# Patient Record
Sex: FEMALE | Race: BLACK OR AFRICAN AMERICAN | Employment: OTHER | URBAN - METROPOLITAN AREA
[De-identification: names, ages, dates, MRNs, and addresses within clinical notes are randomized per-mention and may not be internally consistent; named-entity substitution may affect disease eponyms.]

---

## 2022-09-08 ENCOUNTER — HOSPITAL ENCOUNTER (INPATIENT)
Age: 67
LOS: 4 days | Discharge: HOME HEALTH | DRG: 291 | End: 2022-09-12
Attending: STUDENT IN AN ORGANIZED HEALTH CARE EDUCATION/TRAINING PROGRAM | Admitting: FAMILY MEDICINE
Payer: MEDICARE

## 2022-09-08 ENCOUNTER — APPOINTMENT (OUTPATIENT)
Dept: GENERAL RADIOLOGY | Age: 67
DRG: 291 | End: 2022-09-08
Attending: PHYSICIAN ASSISTANT
Payer: MEDICARE

## 2022-09-08 ENCOUNTER — APPOINTMENT (OUTPATIENT)
Dept: VASCULAR SURGERY | Age: 67
DRG: 291 | End: 2022-09-08
Attending: PHYSICIAN ASSISTANT
Payer: MEDICARE

## 2022-09-08 ENCOUNTER — APPOINTMENT (OUTPATIENT)
Dept: CT IMAGING | Age: 67
DRG: 291 | End: 2022-09-08
Attending: PHYSICIAN ASSISTANT
Payer: MEDICARE

## 2022-09-08 DIAGNOSIS — I50.9 ACUTE ON CHRONIC CONGESTIVE HEART FAILURE, UNSPECIFIED HEART FAILURE TYPE (HCC): ICD-10-CM

## 2022-09-08 DIAGNOSIS — R60.0 EDEMA OF BOTH LEGS: ICD-10-CM

## 2022-09-08 DIAGNOSIS — D72.829 LEUKOCYTOSIS, UNSPECIFIED TYPE: ICD-10-CM

## 2022-09-08 DIAGNOSIS — R06.02 SOBOE (SHORTNESS OF BREATH ON EXERTION): ICD-10-CM

## 2022-09-08 DIAGNOSIS — R77.8 ELEVATED TROPONIN: Primary | ICD-10-CM

## 2022-09-08 DIAGNOSIS — R17 ELEVATED BILIRUBIN: ICD-10-CM

## 2022-09-08 PROBLEM — Z78.9 ALCOHOL USE: Status: ACTIVE | Noted: 2022-09-08

## 2022-09-08 PROBLEM — F17.200 SMOKER: Status: ACTIVE | Noted: 2022-09-08

## 2022-09-08 PROBLEM — I25.10 CAD (CORONARY ARTERY DISEASE): Status: ACTIVE | Noted: 2022-09-08

## 2022-09-08 PROBLEM — R41.3 MEMORY IMPAIRMENT: Status: ACTIVE | Noted: 2022-09-08

## 2022-09-08 PROBLEM — J44.9 COPD (CHRONIC OBSTRUCTIVE PULMONARY DISEASE) (HCC): Status: ACTIVE | Noted: 2022-09-08

## 2022-09-08 LAB
ALBUMIN SERPL-MCNC: 1.8 G/DL (ref 3.4–5)
ALBUMIN/GLOB SERPL: 0.3 {RATIO} (ref 0.8–1.7)
ALP SERPL-CCNC: 265 U/L (ref 45–117)
ALT SERPL-CCNC: 14 U/L (ref 13–56)
ANION GAP SERPL CALC-SCNC: 7 MMOL/L (ref 3–18)
APPEARANCE UR: CLEAR
AST SERPL-CCNC: 37 U/L (ref 10–38)
ATRIAL RATE: 111 BPM
ATRIAL RATE: 99 BPM
BACTERIA URNS QL MICRO: ABNORMAL /HPF
BASOPHILS # BLD: 0 K/UL (ref 0–0.1)
BASOPHILS NFR BLD: 0 % (ref 0–2)
BILIRUB SERPL-MCNC: 3.6 MG/DL (ref 0.2–1)
BILIRUB UR QL: ABNORMAL
BNP SERPL-MCNC: 1658 PG/ML (ref 0–900)
BUN SERPL-MCNC: 6 MG/DL (ref 7–18)
BUN/CREAT SERPL: 8 (ref 12–20)
CALCIUM SERPL-MCNC: 8.3 MG/DL (ref 8.5–10.1)
CALCULATED P AXIS, ECG09: 49 DEGREES
CALCULATED P AXIS, ECG09: 64 DEGREES
CALCULATED R AXIS, ECG10: 35 DEGREES
CALCULATED R AXIS, ECG10: 43 DEGREES
CALCULATED T AXIS, ECG11: 47 DEGREES
CALCULATED T AXIS, ECG11: 49 DEGREES
CHLORIDE SERPL-SCNC: 107 MMOL/L (ref 100–111)
CO2 SERPL-SCNC: 25 MMOL/L (ref 21–32)
COLOR UR: ABNORMAL
CREAT SERPL-MCNC: 0.79 MG/DL (ref 0.6–1.3)
D DIMER PPP FEU-MCNC: 6.82 UG/ML(FEU)
DIAGNOSIS, 93000: NORMAL
DIAGNOSIS, 93000: NORMAL
DIFFERENTIAL METHOD BLD: ABNORMAL
EOSINOPHIL # BLD: 0.9 K/UL (ref 0–0.4)
EOSINOPHIL NFR BLD: 4 % (ref 0–5)
EPITH CASTS URNS QL MICRO: ABNORMAL /LPF (ref 0–5)
ERYTHROCYTE [DISTWIDTH] IN BLOOD BY AUTOMATED COUNT: 17.8 % (ref 11.6–14.5)
GLOBULIN SER CALC-MCNC: 6.7 G/DL (ref 2–4)
GLUCOSE SERPL-MCNC: 97 MG/DL (ref 74–99)
GLUCOSE UR STRIP.AUTO-MCNC: NEGATIVE MG/DL
HCT VFR BLD AUTO: 30.4 % (ref 35–45)
HGB BLD-MCNC: 10.6 G/DL (ref 12–16)
HGB UR QL STRIP: NEGATIVE
IMM GRANULOCYTES # BLD AUTO: 0 K/UL
IMM GRANULOCYTES NFR BLD AUTO: 0 %
KETONES UR QL STRIP.AUTO: NEGATIVE MG/DL
LACTATE SERPL-SCNC: 1 MMOL/L (ref 0.4–2)
LEUKOCYTE ESTERASE UR QL STRIP.AUTO: ABNORMAL
LYMPHOCYTES # BLD: 1.7 K/UL (ref 0.9–3.6)
LYMPHOCYTES NFR BLD: 8 % (ref 21–52)
MCH RBC QN AUTO: 33.3 PG (ref 24–34)
MCHC RBC AUTO-ENTMCNC: 34.9 G/DL (ref 31–37)
MCV RBC AUTO: 95.6 FL (ref 78–100)
MONOCYTES # BLD: 1.1 K/UL (ref 0.05–1.2)
MONOCYTES NFR BLD: 5 % (ref 3–10)
NEUTS BAND NFR BLD MANUAL: 2 % (ref 0–5)
NEUTS SEG # BLD: 17.6 K/UL (ref 1.8–8)
NEUTS SEG NFR BLD: 81 % (ref 40–73)
NITRITE UR QL STRIP.AUTO: NEGATIVE
NRBC # BLD: 0 K/UL (ref 0–0.01)
NRBC BLD-RTO: 0 PER 100 WBC
P-R INTERVAL, ECG05: 108 MS
P-R INTERVAL, ECG05: 122 MS
PH UR STRIP: 6.5 [PH] (ref 5–8)
PLATELET # BLD AUTO: 198 K/UL (ref 135–420)
PLATELET COMMENTS,PCOM: ABNORMAL
PMV BLD AUTO: 11.7 FL (ref 9.2–11.8)
POTASSIUM SERPL-SCNC: 3.7 MMOL/L (ref 3.5–5.5)
PROT SERPL-MCNC: 8.5 G/DL (ref 6.4–8.2)
PROT UR STRIP-MCNC: NEGATIVE MG/DL
Q-T INTERVAL, ECG07: 318 MS
Q-T INTERVAL, ECG07: 344 MS
QRS DURATION, ECG06: 68 MS
QRS DURATION, ECG06: 70 MS
QTC CALCULATION (BEZET), ECG08: 432 MS
QTC CALCULATION (BEZET), ECG08: 441 MS
RBC # BLD AUTO: 3.18 M/UL (ref 4.2–5.3)
RBC #/AREA URNS HPF: NEGATIVE /HPF (ref 0–5)
RBC MORPH BLD: ABNORMAL
RBC MORPH BLD: ABNORMAL
SODIUM SERPL-SCNC: 139 MMOL/L (ref 136–145)
SP GR UR REFRACTOMETRY: 1.01 (ref 1–1.03)
TROPONIN-HIGH SENSITIVITY: 64 NG/L (ref 0–54)
TROPONIN-HIGH SENSITIVITY: 72 NG/L (ref 0–54)
UROBILINOGEN UR QL STRIP.AUTO: 4 EU/DL (ref 0.2–1)
VENTRICULAR RATE, ECG03: 111 BPM
VENTRICULAR RATE, ECG03: 99 BPM
WBC # BLD AUTO: 21.3 K/UL (ref 4.6–13.2)
WBC MORPH BLD: ABNORMAL
WBC URNS QL MICRO: ABNORMAL /HPF (ref 0–5)

## 2022-09-08 PROCEDURE — 71275 CT ANGIOGRAPHY CHEST: CPT

## 2022-09-08 PROCEDURE — 83605 ASSAY OF LACTIC ACID: CPT

## 2022-09-08 PROCEDURE — 83880 ASSAY OF NATRIURETIC PEPTIDE: CPT

## 2022-09-08 PROCEDURE — 74011000250 HC RX REV CODE- 250: Performed by: FAMILY MEDICINE

## 2022-09-08 PROCEDURE — 93005 ELECTROCARDIOGRAM TRACING: CPT

## 2022-09-08 PROCEDURE — 74011250636 HC RX REV CODE- 250/636: Performed by: PHYSICIAN ASSISTANT

## 2022-09-08 PROCEDURE — 65270000046 HC RM TELEMETRY

## 2022-09-08 PROCEDURE — 96375 TX/PRO/DX INJ NEW DRUG ADDON: CPT

## 2022-09-08 PROCEDURE — 74011000636 HC RX REV CODE- 636: Performed by: STUDENT IN AN ORGANIZED HEALTH CARE EDUCATION/TRAINING PROGRAM

## 2022-09-08 PROCEDURE — 96376 TX/PRO/DX INJ SAME DRUG ADON: CPT

## 2022-09-08 PROCEDURE — 96374 THER/PROPH/DIAG INJ IV PUSH: CPT

## 2022-09-08 PROCEDURE — 85025 COMPLETE CBC W/AUTO DIFF WBC: CPT

## 2022-09-08 PROCEDURE — 99285 EMERGENCY DEPT VISIT HI MDM: CPT

## 2022-09-08 PROCEDURE — 84484 ASSAY OF TROPONIN QUANT: CPT

## 2022-09-08 PROCEDURE — 80053 COMPREHEN METABOLIC PANEL: CPT

## 2022-09-08 PROCEDURE — 93970 EXTREMITY STUDY: CPT

## 2022-09-08 PROCEDURE — 85379 FIBRIN DEGRADATION QUANT: CPT

## 2022-09-08 PROCEDURE — 71045 X-RAY EXAM CHEST 1 VIEW: CPT

## 2022-09-08 PROCEDURE — 81001 URINALYSIS AUTO W/SCOPE: CPT

## 2022-09-08 PROCEDURE — 74011250637 HC RX REV CODE- 250/637: Performed by: FAMILY MEDICINE

## 2022-09-08 PROCEDURE — 74176 CT ABD & PELVIS W/O CONTRAST: CPT

## 2022-09-08 RX ORDER — LORAZEPAM 2 MG/ML
1 INJECTION, SOLUTION INTRAMUSCULAR; INTRAVENOUS
Status: DISCONTINUED | OUTPATIENT
Start: 2022-09-08 | End: 2022-09-13 | Stop reason: HOSPADM

## 2022-09-08 RX ORDER — ONDANSETRON 4 MG/1
4 TABLET, ORALLY DISINTEGRATING ORAL
Status: DISCONTINUED | OUTPATIENT
Start: 2022-09-08 | End: 2022-09-13 | Stop reason: HOSPADM

## 2022-09-08 RX ORDER — SODIUM CHLORIDE 0.9 % (FLUSH) 0.9 %
5-40 SYRINGE (ML) INJECTION AS NEEDED
Status: DISCONTINUED | OUTPATIENT
Start: 2022-09-08 | End: 2022-09-13 | Stop reason: HOSPADM

## 2022-09-08 RX ORDER — PANTOPRAZOLE SODIUM 40 MG/1
40 TABLET, DELAYED RELEASE ORAL 2 TIMES DAILY
COMMUNITY

## 2022-09-08 RX ORDER — ENOXAPARIN SODIUM 100 MG/ML
40 INJECTION SUBCUTANEOUS DAILY
Status: DISCONTINUED | OUTPATIENT
Start: 2022-09-09 | End: 2022-09-13 | Stop reason: HOSPADM

## 2022-09-08 RX ORDER — FUROSEMIDE 10 MG/ML
40 INJECTION INTRAMUSCULAR; INTRAVENOUS
Status: COMPLETED | OUTPATIENT
Start: 2022-09-08 | End: 2022-09-08

## 2022-09-08 RX ORDER — LORAZEPAM 2 MG/ML
2 INJECTION, SOLUTION INTRAMUSCULAR; INTRAVENOUS
Status: DISCONTINUED | OUTPATIENT
Start: 2022-09-08 | End: 2022-09-13 | Stop reason: HOSPADM

## 2022-09-08 RX ORDER — MORPHINE SULFATE 2 MG/ML
2 INJECTION, SOLUTION INTRAMUSCULAR; INTRAVENOUS
Status: COMPLETED | OUTPATIENT
Start: 2022-09-08 | End: 2022-09-08

## 2022-09-08 RX ORDER — POLYETHYLENE GLYCOL 3350 17 G/17G
17 POWDER, FOR SOLUTION ORAL DAILY PRN
Status: DISCONTINUED | OUTPATIENT
Start: 2022-09-08 | End: 2022-09-13 | Stop reason: HOSPADM

## 2022-09-08 RX ORDER — LORAZEPAM 2 MG/ML
3 INJECTION, SOLUTION INTRAMUSCULAR; INTRAVENOUS
Status: DISCONTINUED | OUTPATIENT
Start: 2022-09-08 | End: 2022-09-13 | Stop reason: HOSPADM

## 2022-09-08 RX ORDER — SODIUM CHLORIDE 0.9 % (FLUSH) 0.9 %
5-40 SYRINGE (ML) INJECTION EVERY 8 HOURS
Status: DISCONTINUED | OUTPATIENT
Start: 2022-09-08 | End: 2022-09-09 | Stop reason: SDUPTHER

## 2022-09-08 RX ORDER — ACETAMINOPHEN 325 MG/1
650 TABLET ORAL
Status: DISCONTINUED | OUTPATIENT
Start: 2022-09-08 | End: 2022-09-13 | Stop reason: HOSPADM

## 2022-09-08 RX ORDER — ONDANSETRON 2 MG/ML
4 INJECTION INTRAMUSCULAR; INTRAVENOUS
Status: COMPLETED | OUTPATIENT
Start: 2022-09-08 | End: 2022-09-08

## 2022-09-08 RX ORDER — SODIUM CHLORIDE 0.9 % (FLUSH) 0.9 %
5-40 SYRINGE (ML) INJECTION EVERY 8 HOURS
Status: DISCONTINUED | OUTPATIENT
Start: 2022-09-08 | End: 2022-09-13 | Stop reason: HOSPADM

## 2022-09-08 RX ORDER — ALBUTEROL SULFATE 90 UG/1
AEROSOL, METERED RESPIRATORY (INHALATION)
COMMUNITY

## 2022-09-08 RX ORDER — ACETAMINOPHEN 650 MG/1
650 SUPPOSITORY RECTAL
Status: DISCONTINUED | OUTPATIENT
Start: 2022-09-08 | End: 2022-09-13 | Stop reason: HOSPADM

## 2022-09-08 RX ORDER — SODIUM CHLORIDE 0.9 % (FLUSH) 0.9 %
5-40 SYRINGE (ML) INJECTION AS NEEDED
Status: DISCONTINUED | OUTPATIENT
Start: 2022-09-08 | End: 2022-09-09 | Stop reason: SDUPTHER

## 2022-09-08 RX ORDER — LORAZEPAM 1 MG/1
1 TABLET ORAL
Status: DISCONTINUED | OUTPATIENT
Start: 2022-09-08 | End: 2022-09-13 | Stop reason: HOSPADM

## 2022-09-08 RX ORDER — GUAIFENESIN 100 MG/5ML
324 LIQUID (ML) ORAL
Status: ACTIVE | OUTPATIENT
Start: 2022-09-08 | End: 2022-09-09

## 2022-09-08 RX ORDER — LORAZEPAM 1 MG/1
2 TABLET ORAL
Status: DISCONTINUED | OUTPATIENT
Start: 2022-09-08 | End: 2022-09-13 | Stop reason: HOSPADM

## 2022-09-08 RX ORDER — ONDANSETRON 2 MG/ML
4 INJECTION INTRAMUSCULAR; INTRAVENOUS
Status: DISCONTINUED | OUTPATIENT
Start: 2022-09-08 | End: 2022-09-13 | Stop reason: HOSPADM

## 2022-09-08 RX ADMIN — FUROSEMIDE 40 MG: 10 INJECTION, SOLUTION INTRAMUSCULAR; INTRAVENOUS at 20:04

## 2022-09-08 RX ADMIN — MORPHINE SULFATE 2 MG: 2 INJECTION, SOLUTION INTRAMUSCULAR; INTRAVENOUS at 19:05

## 2022-09-08 RX ADMIN — ACETAMINOPHEN 650 MG: 325 TABLET ORAL at 22:16

## 2022-09-08 RX ADMIN — SODIUM CHLORIDE, PRESERVATIVE FREE 10 ML: 5 INJECTION INTRAVENOUS at 22:17

## 2022-09-08 RX ADMIN — IOPAMIDOL 100 ML: 755 INJECTION, SOLUTION INTRAVENOUS at 18:18

## 2022-09-08 RX ADMIN — ONDANSETRON 4 MG: 2 INJECTION INTRAMUSCULAR; INTRAVENOUS at 16:54

## 2022-09-08 RX ADMIN — MORPHINE SULFATE 2 MG: 2 INJECTION, SOLUTION INTRAMUSCULAR; INTRAVENOUS at 16:51

## 2022-09-08 NOTE — ED PROVIDER NOTES
EMERGENCY DEPARTMENT HISTORY AND PHYSICAL EXAM    Date: 9/8/2022  Patient Name: Anna Roman    History of Presenting Illness     Chief Complaint   Patient presents with    Leg Pain    Chest Pain         History Provided By: Patient    4:13 PM  Anna Roman is a 74 y.o. female with PMHX of HTN, COPD, possible CHF who presents to the emergency department C/O bilateral leg pain and swelling which has been worsening x2 weeks. She also had mild left to mid chest pain yesterday evening that has since resolved. She does have a history of COPD and states she gets short of breath with exertion, this is not new or changed. Pt denies fever, abdominal pain, nausea, vomiting, dysuria, injury or trauma, and any other sxs or complaints. PCP: Alen Huerta PA-C        Past History     Past Medical History:  Past Medical History:   Diagnosis Date    CAD (coronary artery disease) 9/8/2022    Chronic obstructive pulmonary disease ([facility] [address]     COPD (chronic obstructive pulmonary disease) ([facility] [address] 9/8/2022    Heart failure ([facility] [address]     Hypertension     Memory impairment 9/8/2022       Past Surgical History:  Past Surgical History:   Procedure Laterality Date    HX GYN         Family History:  History reviewed. No pertinent family history. Social History:  Social History     Tobacco Use    Smoking status: Every Day     Packs/day: 0.50     Types: Cigarettes    Smokeless tobacco: Never   Substance Use Topics    Alcohol use: Yes     Alcohol/week: 7.0 standard drinks     Types: 7 Cans of beer per week       Allergies:  No Known Allergies      Review of Systems   Review of Systems   Constitutional: Negative. Negative for fever. Respiratory:  Positive for shortness of breath. Negative for cough. Cardiovascular:  Positive for chest pain and leg swelling. Gastrointestinal:  Negative for abdominal pain. Musculoskeletal:  Positive for arthralgias, joint swelling and myalgias. Skin: Negative.     Neurological:  Negative for weakness and numbness. All other systems reviewed and are negative. Physical Exam     Vitals:    09/08/22 1851 09/08/22 1854 09/08/22 1908 09/08/22 2006   BP:   119/64 122/62   Pulse: (!) 103 (!) 101 100 96   Resp:  15 16 16   Temp:       SpO2:  98% 99% 98%   Weight:       Height:         Physical Exam  Vital signs and nursing notes reviewed. CONSTITUTIONAL: Alert. Well-appearing; thin female, in no apparent distress. HEAD: Normocephalic; atraumatic. EYES:   Conjunctiva clear. NECK: Supple; FROM without difficulty, non-tender; no cervical lymphadenopathy. No JVD. CV: Normal S1, S2; no murmurs, rubs, or gallops. No chest wall tenderness. RESPIRATORY: Normal chest excursion with respiration; breath sounds clear and equal bilaterally; no wheezes, rhonchi, or rales. GI: Normal bowel sounds; non-distended; non-tender. BACK:  No evidence of trauma or deformity. Non-tender to palpation. FROM without difficulty. EXT: BLE: 1-2+ pitting edema bilateral lower legs with generalized tenderness, no rash. Distal sensation tact. 2+ DP pulses. SKIN: Normal for age and race; warm; dry; good turgor; no apparent lesions or exudate. NEURO: A & O x3. Cranial nerves 2-12 intact. Motor 5/5 bilaterally. Sensation intact. PSYCH:  Mood and affect appropriate.            Diagnostic Study Results     Labs -     Recent Results (from the past 12 hour(s))   EKG, 12 LEAD, INITIAL    Collection Time: 09/08/22  4:28 PM   Result Value Ref Range    Ventricular Rate 111 BPM    Atrial Rate 111 BPM    P-R Interval 122 ms    QRS Duration 68 ms    Q-T Interval 318 ms    QTC Calculation (Bezet) 432 ms    Calculated P Axis 49 degrees    Calculated R Axis 43 degrees    Calculated T Axis 49 degrees    Diagnosis       Sinus tachycardia  Otherwise normal ECG  No previous ECGs available     CBC WITH AUTOMATED DIFF    Collection Time: 09/08/22  4:30 PM   Result Value Ref Range    WBC 21.3 (H) 4.6 - 13.2 K/uL    RBC 3.18 (L) 4.20 - 5.30 M/uL    HGB 10.6 (L) 12.0 - 16.0 g/dL    HCT 30.4 (L) 35.0 - 45.0 %    MCV 95.6 78.0 - 100.0 FL    MCH 33.3 24.0 - 34.0 PG    MCHC 34.9 31.0 - 37.0 g/dL    RDW 17.8 (H) 11.6 - 14.5 %    PLATELET 180 639 - 703 K/uL    MPV 11.7 9.2 - 11.8 FL    NRBC 0.0 0  WBC    ABSOLUTE NRBC 0.00 0.00 - 0.01 K/uL    NEUTROPHILS 81 (H) 40 - 73 %    BAND NEUTROPHILS 2 0 - 5 %    LYMPHOCYTES 8 (L) 21 - 52 %    MONOCYTES 5 3 - 10 %    EOSINOPHILS 4 0 - 5 %    BASOPHILS 0 0 - 2 %    IMMATURE GRANULOCYTES 0 %    ABS. NEUTROPHILS 17.6 (H) 1.8 - 8.0 K/UL    ABS. LYMPHOCYTES 1.7 0.9 - 3.6 K/UL    ABS. MONOCYTES 1.1 0.05 - 1.2 K/UL    ABS. EOSINOPHILS 0.9 (H) 0.0 - 0.4 K/UL    ABS. BASOPHILS 0.0 0.0 - 0.1 K/UL    ABS. IMM. GRANS. 0.0 K/UL    DF MANUAL      PLATELET COMMENTS LARGE PLATELETS      RBC COMMENTS ANISOCYTOSIS  1+        RBC COMMENTS TARGET CELLS  1+        WBC COMMENTS VACUOLATED POLYS     METABOLIC PANEL, COMPREHENSIVE    Collection Time: 09/08/22  4:30 PM   Result Value Ref Range    Sodium 139 136 - 145 mmol/L    Potassium 3.7 3.5 - 5.5 mmol/L    Chloride 107 100 - 111 mmol/L    CO2 25 21 - 32 mmol/L    Anion gap 7 3.0 - 18 mmol/L    Glucose 97 74 - 99 mg/dL    BUN 6 (L) 7.0 - 18 MG/DL    Creatinine 0.79 0.6 - 1.3 MG/DL    BUN/Creatinine ratio 8 (L) 12 - 20      GFR est AA >60 >60 ml/min/1.73m2    GFR est non-AA >60 >60 ml/min/1.73m2    Calcium 8.3 (L) 8.5 - 10.1 MG/DL    Bilirubin, total 3.6 (H) 0.2 - 1.0 MG/DL    ALT (SGPT) 14 13 - 56 U/L    AST (SGOT) 37 10 - 38 U/L    Alk.  phosphatase 265 (H) 45 - 117 U/L    Protein, total 8.5 (H) 6.4 - 8.2 g/dL    Albumin 1.8 (L) 3.4 - 5.0 g/dL    Globulin 6.7 (H) 2.0 - 4.0 g/dL    A-G Ratio 0.3 (L) 0.8 - 1.7     NT-PRO BNP    Collection Time: 09/08/22  4:30 PM   Result Value Ref Range    NT pro-BNP 1,658 (H) 0 - 900 PG/ML   TROPONIN-HIGH SENSITIVITY    Collection Time: 09/08/22  4:30 PM   Result Value Ref Range    Troponin-High Sensitivity 72 (H) 0 - 54 ng/L   D DIMER Collection Time: 09/08/22  4:30 PM   Result Value Ref Range    D DIMER 6.82 (H) <0.46 ug/ml(FEU)   TROPONIN-HIGH SENSITIVITY    Collection Time: 09/08/22  7:07 PM   Result Value Ref Range    Troponin-High Sensitivity 64 (H) 0 - 54 ng/L   EKG, 12 LEAD, SUBSEQUENT    Collection Time: 09/08/22  7:15 PM   Result Value Ref Range    Ventricular Rate 99 BPM    Atrial Rate 99 BPM    P-R Interval 108 ms    QRS Duration 70 ms    Q-T Interval 344 ms    QTC Calculation (Bezet) 441 ms    Calculated P Axis 64 degrees    Calculated R Axis 35 degrees    Calculated T Axis 47 degrees    Diagnosis       Sinus rhythm with short [place]  Otherwise normal ECG  When compared with ECG of 08-SEP-2022 16:28,  No significant change was found         Radiologic Studies -   CTA CHEST W OR W WO CONT   Final Result      1. No evidence of pulmonary embolism. 2.  Cardiomegaly with Multifocal bilateral lower lobe and right middle lobe   bandlike and linear atelectasis. Asymmetric enlarged right hilar lymph node,   potentially reactive. 3. Nonspecific small volume of abdominal ascites. DUPLEX LOWER EXT VENOUS BILAT   Final Result      XR CHEST PORT   Final Result      1. Bibasilar atelectasis and/or scarring, with nonspecific interstitial   prominence. Differential considerations may include early interstitial edema   versus chronic versus reactive small airways process. CT ABD PELV WO CONT    (Results Pending)     CT Results  (Last 48 hours)                 09/08/22 1836  CTA CHEST W OR W WO CONT Final result    Impression:      1. No evidence of pulmonary embolism. 2.  Cardiomegaly with Multifocal bilateral lower lobe and right middle lobe   bandlike and linear atelectasis. Asymmetric enlarged right hilar lymph node,   potentially reactive. 3. Nonspecific small volume of abdominal ascites.        Narrative:  EXAM: CTA CHEST W OR W WO CONT       CLINICAL INDICATION/HISTORY: BLE swelling, intermittent CP and SOB, elevated d dimer   -Additional: None       COMPARISON: None       TECHNIQUE: Axial CT imaging from the thoracic inlet through the diaphragm with   intravenous contrast. Coronal and sagittal MIP reformats were generated. One or more dose reduction techniques were used on this CT: automated exposure   control, adjustment of the mAs and/or kVp according to patient size, and   iterative reconstruction techniques. The specific techniques used on this CT   exam have been documented in the patient's electronic medical record. Digital   Imaging and Communications in Medicine (DICOM) format image data are available   to nonaffiliated external healthcare facilities or entities on a secure, media   free, reciprocally searchable basis with patient authorization for at least a   12-month period after this study. _______________       FINDINGS:       EXAM QUALITY: Adequate bolus timing with mild respiratory motion artifact   degradation. PULMONARY ARTERIES: No central, interlobar or visualized segmental branch   pulmonary arterial embolus. Normal caliber main pulmonary artery. MEDIASTINUM: Cardiomegaly without pericardial effusion. Trivial atherosclerotic   calcifications of the transverse aortic arch. LUNGS: Multifocal bilateral lower lobe and right middle lobe bandlike and linear   atelectasis. No consolidation. Cherl Spinner PLEURA: No pneumothorax or effusion. AIRWAY: Normal.       LYMPH NODES: Nonspecific Right hilar 1.6 cm enlarged lymph node. UPPER ABDOMEN: Small volume upper abdominal ascites and mesenteric fluid. Prior   cholecystectomy. Diffusely heterogeneous enhancing appearance of the liver which   is likely secondary to bolus timing artifact. .       OTHER: No acute or aggressive osseous abnormalities identified. _______________                 CXR Results  (Last 48 hours)                 09/08/22 1655  XR CHEST PORT Final result    Impression:      1.  Bibasilar atelectasis and/or scarring, with nonspecific interstitial   prominence. Differential considerations may include early interstitial edema   versus chronic versus reactive small airways process. Narrative:  EXAM: XR CHEST PORT       CLINICAL INDICATION/HISTORY: CP, SOB, BLE edema   -Additional: None       COMPARISON: None       TECHNIQUE: Frontal view of the chest       _______________       FINDINGS:       HEART AND MEDIASTINUM: Midline cardiac silhouette, normal in size. Unremarkable   hilar vascular structures. LUNGS AND PLEURAL SPACES: Bibasilar discoid interstitial opacities with mild   diffuse interstitial prominence. No pneumothorax or effusion. BONY THORAX AND SOFT TISSUES: No acute or destructive osseous abnormality.        _______________                   Medications given in the ED-  Medications   sodium chloride (NS) flush 5-40 mL (has no administration in time range)   sodium chloride (NS) flush 5-40 mL (has no administration in time range)   acetaminophen (TYLENOL) tablet 650 mg (has no administration in time range)     Or   acetaminophen (TYLENOL) suppository 650 mg (has no administration in time range)   polyethylene glycol (MIRALAX) packet 17 g (has no administration in time range)   ondansetron (ZOFRAN ODT) tablet 4 mg (has no administration in time range)     Or   ondansetron (ZOFRAN) injection 4 mg (has no administration in time range)   enoxaparin (LOVENOX) injection 40 mg (has no administration in time range)   aspirin chewable tablet 324 mg (has no administration in time range)   morphine injection 2 mg (2 mg IntraVENous Given 9/8/22 1651)   ondansetron (ZOFRAN) injection 4 mg (4 mg IntraVENous Given 9/8/22 1654)   iopamidoL (ISOVUE-370) 76 % injection 100 mL (100 mL IntraVENous Given 9/8/22 1818)   morphine injection 2 mg (2 mg IntraVENous Given 9/8/22 1905)   furosemide (LASIX) injection 40 mg (40 mg IntraVENous Given 9/8/22 2004)         Medical Decision Making   I am the first provider for this patient. I reviewed the vital signs, available nursing notes, past medical history, past surgical history, family history and social history. Vital Signs-Reviewed the patient's vital signs. Records Reviewed: Nursing Notes    11/26/2018 Stress Test   · The EKG stress is negative for ischemia. · LV perfusion is normal with no evidence of inducible ischemia. · The EKG and imaging portions of the stress study are concordant with no   evidence of inducible myocardial ischemia. Procedures:  Procedures    ED Course:  4:13 PM   Initial assessment performed. The patients presenting problems have been discussed, and they are in agreement with the care plan formulated and outlined with them. I have encouraged them to ask questions as they arise throughout their visit. Provider Notes (Medical Decision Making): Rafael Cerda is a 74 y.o. female who presented with 2 weeks of worsening bilateral lower extremity pain and swelling, now with difficulty ambulating due to her leg pain. She also had some chest pain last night and has had frequent bouts of dyspnea on exertion which she attributed to her COPD. Patient does still smoke. On exam she appears somewhat chronically ill-appearing with 2+ edema and tenderness to bilateral lower extremities. Her vitals are stable, labs revealed leukocytosis of 21,000 though review of records shows that she has had unexplained leukocytosis for several months that has not yet apparently been worked up yet nor has she seen hematology. She denies any fever or symptoms of illness. The rest of her labs show mild anemia, D-dimer was elevated so CTA was performed which was negative for PE but she did have cardiomegaly hilar adenopathy, atelectasis and small volume of abdominal ascites. Bilateral lower extremity duplex is negative for DVT. Her troponin was elevated at 72, EKG without ischemic changes and denies any chest pain or shortness of breath today.   Her proBNP is also elevated so we will diurese with Lasix. she had a stress test in 2018 which was negative for ischemia, but due to evidence of CHF and elevated troponin, unexplained leukocytosis at this time, will admit for further work-up. We will also add CT abdomen pelvis for elevated bilirubin and further evaluation of abdominal ascites seen on CTA of the chest but she has not had any abdominal pain vomiting or diarrhea. Diagnosis and Disposition       8:09 PM consult note  Case discussed with hospitalist Dr. Maggie Hancock who will admit to telemetry floor. Aware of pending CT abd/pelv. ADMISSION NOTE:  8:13 PM  Patient is being admitted to the hospital by Dr. Maggie Hancock. The results of their tests and reasons for their admission have been discussed with them and/or available family. They convey agreement and understanding for the need to be admitted and for their admission diagnosis. CONDITIONS ON ADMISSION:  Deep Vein Thrombosis is not present at the time of admission. Thrombosis is not present at the time of admission. Urinary Tract Infection is not present at the time of admission. Pneumonia is not present at the time of admission. MRSA is not present at the time of admission. Wound infection is not present at the time of admission. Pressure Ulcer is not present at the time of admission. CLINICAL IMPRESSION:    1. Elevated troponin    2. Acute on chronic congestive heart failure, unspecified heart failure type (HCC)    3. Edema of both legs    4. Leukocytosis, unspecified type    5. Elevated bilirubin        PLAN:  1. Admit      _______________________________      Please note that this dictation was completed with [employer], the computer voice recognition software. Quite often unanticipated grammatical, syntax, homophones, and other interpretive errors are inadvertently transcribed by the computer software. Please disregard these errors.   Please excuse any errors that have escaped final proofreading.

## 2022-09-08 NOTE — Clinical Note
Status[de-identified] INPATIENT [101]   Type of Bed: Telemetry [19]   Cardiac Monitoring Required?: Yes   Inpatient Hospitalization Certified Necessary for the Following Reasons: 3.  Patient receiving treatment that can only be provided in an inpatient setting (further clarification in H&P documentation)   Admitting Diagnosis: Elevated troponin [[id number]]   Admitting Physician: Rosalinda Taylor [[id number]]   Attending Physician: Rosalinda Taylor [[id number]]   Estimated Length of Stay: 2 Midnights   Discharge Plan[de-identified] Home with Office Follow-up

## 2022-09-09 ENCOUNTER — APPOINTMENT (OUTPATIENT)
Dept: ULTRASOUND IMAGING | Age: 67
DRG: 291 | End: 2022-09-09
Attending: FAMILY MEDICINE
Payer: MEDICARE

## 2022-09-09 ENCOUNTER — APPOINTMENT (OUTPATIENT)
Dept: NON INVASIVE DIAGNOSTICS | Age: 67
DRG: 291 | End: 2022-09-09
Attending: FAMILY MEDICINE
Payer: MEDICARE

## 2022-09-09 PROBLEM — R77.8 ELEVATED TROPONIN: Status: ACTIVE | Noted: 2022-09-09

## 2022-09-09 PROBLEM — E46 HYPOALBUMINEMIA DUE TO PROTEIN-CALORIE MALNUTRITION (HCC): Status: ACTIVE | Noted: 2022-09-09

## 2022-09-09 PROBLEM — Z78.9 POOR HISTORIAN: Status: ACTIVE | Noted: 2022-09-09

## 2022-09-09 PROBLEM — D64.9 ANEMIA: Status: ACTIVE | Noted: 2022-09-09

## 2022-09-09 PROBLEM — K76.0 HEPATIC STEATOSIS: Status: ACTIVE | Noted: 2022-09-09

## 2022-09-09 PROBLEM — E88.09 HYPOALBUMINEMIA DUE TO PROTEIN-CALORIE MALNUTRITION (HCC): Status: ACTIVE | Noted: 2022-09-09

## 2022-09-09 PROBLEM — Z91.14 NONCOMPLIANCE WITH MEDICATIONS: Status: ACTIVE | Noted: 2022-09-09

## 2022-09-09 LAB
ALBUMIN SERPL-MCNC: 1.4 G/DL (ref 3.4–5)
ALBUMIN/GLOB SERPL: 0.3 {RATIO} (ref 0.8–1.7)
ALP SERPL-CCNC: 210 U/L (ref 45–117)
ALT SERPL-CCNC: 12 U/L (ref 13–56)
AMMONIA PLAS-SCNC: 47 UMOL/L (ref 11–32)
ANION GAP SERPL CALC-SCNC: 6 MMOL/L (ref 3–18)
AST SERPL-CCNC: 35 U/L (ref 10–38)
BILIRUB SERPL-MCNC: 2.9 MG/DL (ref 0.2–1)
BUN SERPL-MCNC: 6 MG/DL (ref 7–18)
BUN/CREAT SERPL: 7 (ref 12–20)
CALCIUM SERPL-MCNC: 7.8 MG/DL (ref 8.5–10.1)
CHLORIDE SERPL-SCNC: 107 MMOL/L (ref 100–111)
CHOLEST SERPL-MCNC: 88 MG/DL
CO2 SERPL-SCNC: 26 MMOL/L (ref 21–32)
CREAT SERPL-MCNC: 0.86 MG/DL (ref 0.6–1.3)
CRP SERPL HS-MCNC: >9.5 MG/L
ECHO AO ROOT DIAM: 2.9 CM
ECHO AO ROOT INDEX: 1.88 CM/M2
ECHO AV AREA PEAK VELOCITY: 1.8 CM2
ECHO AV AREA VTI: 1.8 CM2
ECHO AV AREA/BSA PEAK VELOCITY: 1.2 CM2/M2
ECHO AV AREA/BSA VTI: 1.2 CM2/M2
ECHO AV MEAN GRADIENT: 6 MMHG
ECHO AV MEAN VELOCITY: 1.2 M/S
ECHO AV PEAK GRADIENT: 11 MMHG
ECHO AV PEAK VELOCITY: 1.7 M/S
ECHO AV VELOCITY RATIO: 0.59
ECHO AV VTI: 37.3 CM
ECHO EST RA PRESSURE: 8 MMHG
ECHO IVC PROX: 20 CM
ECHO LA DIAMETER INDEX: 2.47 CM/M2
ECHO LA DIAMETER: 3.8 CM
ECHO LA TO AORTIC ROOT RATIO: 1.31
ECHO LA VOL 2C: 42 ML (ref 22–52)
ECHO LA VOL 4C: 42 ML (ref 22–52)
ECHO LA VOL BP: 42 ML (ref 22–52)
ECHO LA VOL/BSA BIPLANE: 27 ML/M2 (ref 16–34)
ECHO LA VOLUME AREA LENGTH: 47 ML
ECHO LA VOLUME INDEX A2C: 27 ML/M2 (ref 16–34)
ECHO LA VOLUME INDEX A4C: 27 ML/M2 (ref 16–34)
ECHO LA VOLUME INDEX AREA LENGTH: 31 ML/M2 (ref 16–34)
ECHO LV E' LATERAL VELOCITY: 11 CM/S
ECHO LV E' SEPTAL VELOCITY: 10 CM/S
ECHO LV EDV A2C: 72 ML
ECHO LV EDV A4C: 72 ML
ECHO LV EDV BP: 74 ML (ref 56–104)
ECHO LV EDV INDEX A4C: 47 ML/M2
ECHO LV EDV INDEX BP: 48 ML/M2
ECHO LV EDV NDEX A2C: 47 ML/M2
ECHO LV EJECTION FRACTION A2C: 53 %
ECHO LV EJECTION FRACTION A4C: 58 %
ECHO LV EJECTION FRACTION BIPLANE: 57 % (ref 55–100)
ECHO LV ESV A2C: 34 ML
ECHO LV ESV A4C: 30 ML
ECHO LV ESV BP: 32 ML (ref 19–49)
ECHO LV ESV INDEX A2C: 22 ML/M2
ECHO LV ESV INDEX A4C: 19 ML/M2
ECHO LV ESV INDEX BP: 21 ML/M2
ECHO LV FRACTIONAL SHORTENING: 28 % (ref 28–44)
ECHO LV GLOBAL LONGITUDINAL STRAIN (GLS): -18.5 %
ECHO LV INTERNAL DIMENSION DIASTOLE INDEX: 3.51 CM/M2
ECHO LV INTERNAL DIMENSION DIASTOLIC: 5.4 CM (ref 3.9–5.3)
ECHO LV INTERNAL DIMENSION SYSTOLIC INDEX: 2.53 CM/M2
ECHO LV INTERNAL DIMENSION SYSTOLIC: 3.9 CM
ECHO LV IVSD: 0.6 CM (ref 0.6–0.9)
ECHO LV MASS 2D: 98.1 G (ref 67–162)
ECHO LV MASS INDEX 2D: 63.7 G/M2 (ref 43–95)
ECHO LV POSTERIOR WALL DIASTOLIC: 0.5 CM (ref 0.6–0.9)
ECHO LV RELATIVE WALL THICKNESS RATIO: 0.19
ECHO LVOT AREA: 3.1 CM2
ECHO LVOT AV VTI INDEX: 0.59
ECHO LVOT DIAM: 2 CM
ECHO LVOT MEAN GRADIENT: 2 MMHG
ECHO LVOT PEAK GRADIENT: 4 MMHG
ECHO LVOT PEAK VELOCITY: 1 M/S
ECHO LVOT STROKE VOLUME INDEX: 45.1 ML/M2
ECHO LVOT SV: 69.4 ML
ECHO LVOT VTI: 22.1 CM
ECHO MV A VELOCITY: 0.96 M/S
ECHO MV E DECELERATION TIME (DT): 192 MS
ECHO MV E VELOCITY: 1.07 M/S
ECHO MV E/A RATIO: 1.11
ECHO MV E/E' LATERAL: 9.73
ECHO MV E/E' RATIO (AVERAGED): 10.21
ECHO MV E/E' SEPTAL: 10.7
ECHO PULMONARY ARTERY SYSTOLIC PRESSURE (PASP): 44 MMHG
ECHO RIGHT VENTRICULAR SYSTOLIC PRESSURE (RVSP): 44 MMHG
ECHO RV FREE WALL PEAK S': 15 CM/S
ECHO RV INTERNAL DIMENSION: 2.6 CM
ECHO RV TAPSE: 2.5 CM (ref 1.7–?)
ECHO TV REGURGITANT MAX VELOCITY: 3.02 M/S
ECHO TV REGURGITANT PEAK GRADIENT: 36 MMHG
ERYTHROCYTE [DISTWIDTH] IN BLOOD BY AUTOMATED COUNT: 18 % (ref 11.6–14.5)
ERYTHROCYTE [SEDIMENTATION RATE] IN BLOOD: 42 MM/HR (ref 0–30)
FERRITIN SERPL-MCNC: 152 NG/ML (ref 8–388)
FOLATE SERPL-MCNC: 12.1 NG/ML (ref 3.1–17.5)
GLOBULIN SER CALC-MCNC: 5.6 G/DL (ref 2–4)
GLUCOSE SERPL-MCNC: 94 MG/DL (ref 74–99)
HAPTOGLOB SERPL-MCNC: 41 MG/DL (ref 30–200)
HCT VFR BLD AUTO: 25.7 % (ref 35–45)
HDLC SERPL-MCNC: 21 MG/DL (ref 40–60)
HDLC SERPL: 4.2 {RATIO} (ref 0–5)
HGB BLD-MCNC: 8.9 G/DL (ref 12–16)
IGA SERPL-MCNC: 1240 MG/DL (ref 70–400)
IGG SERPL-MCNC: 2830 MG/DL (ref 700–1600)
IGM SERPL-MCNC: 178 MG/DL (ref 40–230)
IRON SATN MFR SERPL: 114 % (ref 20–50)
IRON SERPL-MCNC: 80 UG/DL (ref 50–175)
LDH SERPL L TO P-CCNC: 81 U/L (ref 81–234)
LDLC SERPL CALC-MCNC: 49.6 MG/DL (ref 0–100)
LIPID PROFILE,FLP: ABNORMAL
MCH RBC QN AUTO: 33.1 PG (ref 24–34)
MCHC RBC AUTO-ENTMCNC: 34.6 G/DL (ref 31–37)
MCV RBC AUTO: 95.5 FL (ref 78–100)
NRBC # BLD: 0 K/UL (ref 0–0.01)
NRBC BLD-RTO: 0 PER 100 WBC
PLATELET # BLD AUTO: 158 K/UL (ref 135–420)
PMV BLD AUTO: 12.1 FL (ref 9.2–11.8)
POTASSIUM SERPL-SCNC: 3.5 MMOL/L (ref 3.5–5.5)
PROT SERPL-MCNC: 7 G/DL (ref 6.4–8.2)
RBC # BLD AUTO: 2.69 M/UL (ref 4.2–5.3)
SODIUM SERPL-SCNC: 139 MMOL/L (ref 136–145)
TIBC SERPL-MCNC: 70 UG/DL (ref 250–450)
TRIGL SERPL-MCNC: 87 MG/DL (ref ?–150)
TROPONIN-HIGH SENSITIVITY: 61 NG/L (ref 0–54)
TROPONIN-HIGH SENSITIVITY: 61 NG/L (ref 0–54)
VIT B12 SERPL-MCNC: >2000 PG/ML (ref 211–911)
VLDLC SERPL CALC-MCNC: 17.4 MG/DL
WBC # BLD AUTO: 15.9 K/UL (ref 4.6–13.2)

## 2022-09-09 PROCEDURE — 74011250637 HC RX REV CODE- 250/637: Performed by: FAMILY MEDICINE

## 2022-09-09 PROCEDURE — 83521 IG LIGHT CHAINS FREE EACH: CPT

## 2022-09-09 PROCEDURE — 85652 RBC SED RATE AUTOMATED: CPT

## 2022-09-09 PROCEDURE — 86141 C-REACTIVE PROTEIN HS: CPT

## 2022-09-09 PROCEDURE — 76705 ECHO EXAM OF ABDOMEN: CPT

## 2022-09-09 PROCEDURE — 84484 ASSAY OF TROPONIN QUANT: CPT

## 2022-09-09 PROCEDURE — 36415 COLL VENOUS BLD VENIPUNCTURE: CPT

## 2022-09-09 PROCEDURE — 94640 AIRWAY INHALATION TREATMENT: CPT

## 2022-09-09 PROCEDURE — 80053 COMPREHEN METABOLIC PANEL: CPT

## 2022-09-09 PROCEDURE — P9047 ALBUMIN (HUMAN), 25%, 50ML: HCPCS | Performed by: FAMILY MEDICINE

## 2022-09-09 PROCEDURE — 82784 ASSAY IGA/IGD/IGG/IGM EACH: CPT

## 2022-09-09 PROCEDURE — 74011000250 HC RX REV CODE- 250: Performed by: FAMILY MEDICINE

## 2022-09-09 PROCEDURE — 80061 LIPID PANEL: CPT

## 2022-09-09 PROCEDURE — 65270000046 HC RM TELEMETRY

## 2022-09-09 PROCEDURE — 93306 TTE W/DOPPLER COMPLETE: CPT

## 2022-09-09 PROCEDURE — 83010 ASSAY OF HAPTOGLOBIN QUANT: CPT

## 2022-09-09 PROCEDURE — 83540 ASSAY OF IRON: CPT

## 2022-09-09 PROCEDURE — 82728 ASSAY OF FERRITIN: CPT

## 2022-09-09 PROCEDURE — 99221 1ST HOSP IP/OBS SF/LOW 40: CPT | Performed by: NURSE PRACTITIONER

## 2022-09-09 PROCEDURE — 85027 COMPLETE CBC AUTOMATED: CPT

## 2022-09-09 PROCEDURE — 74011250636 HC RX REV CODE- 250/636: Performed by: FAMILY MEDICINE

## 2022-09-09 PROCEDURE — 82140 ASSAY OF AMMONIA: CPT

## 2022-09-09 PROCEDURE — 83615 LACTATE (LD) (LDH) ENZYME: CPT

## 2022-09-09 PROCEDURE — 82607 VITAMIN B-12: CPT

## 2022-09-09 RX ORDER — SERTRALINE HYDROCHLORIDE 50 MG/1
25 TABLET, FILM COATED ORAL DAILY
Status: DISCONTINUED | OUTPATIENT
Start: 2022-09-09 | End: 2022-09-13 | Stop reason: HOSPADM

## 2022-09-09 RX ORDER — IPRATROPIUM BROMIDE 0.5 MG/2.5ML
0.5 SOLUTION RESPIRATORY (INHALATION) 2 TIMES DAILY
Status: DISCONTINUED | OUTPATIENT
Start: 2022-09-09 | End: 2022-09-09

## 2022-09-09 RX ORDER — CARVEDILOL 3.12 MG/1
3.12 TABLET ORAL 2 TIMES DAILY WITH MEALS
Status: DISCONTINUED | OUTPATIENT
Start: 2022-09-09 | End: 2022-09-13 | Stop reason: HOSPADM

## 2022-09-09 RX ORDER — IPRATROPIUM BROMIDE 0.5 MG/2.5ML
0.5 SOLUTION RESPIRATORY (INHALATION)
Status: DISCONTINUED | OUTPATIENT
Start: 2022-09-09 | End: 2022-09-13 | Stop reason: HOSPADM

## 2022-09-09 RX ORDER — ALBUMIN HUMAN 250 G/1000ML
25 SOLUTION INTRAVENOUS EVERY 6 HOURS
Status: DISCONTINUED | OUTPATIENT
Start: 2022-09-09 | End: 2022-09-13 | Stop reason: HOSPADM

## 2022-09-09 RX ORDER — GUAIFENESIN 100 MG/5ML
81 LIQUID (ML) ORAL DAILY
Status: DISCONTINUED | OUTPATIENT
Start: 2022-09-09 | End: 2022-09-13 | Stop reason: HOSPADM

## 2022-09-09 RX ORDER — FUROSEMIDE 10 MG/ML
20 INJECTION INTRAMUSCULAR; INTRAVENOUS 2 TIMES DAILY
Status: DISCONTINUED | OUTPATIENT
Start: 2022-09-09 | End: 2022-09-13 | Stop reason: HOSPADM

## 2022-09-09 RX ORDER — PANTOPRAZOLE SODIUM 40 MG/1
40 TABLET, DELAYED RELEASE ORAL 2 TIMES DAILY
Status: DISCONTINUED | OUTPATIENT
Start: 2022-09-09 | End: 2022-09-13 | Stop reason: HOSPADM

## 2022-09-09 RX ADMIN — FUROSEMIDE 20 MG: 10 INJECTION, SOLUTION INTRAMUSCULAR; INTRAVENOUS at 09:32

## 2022-09-09 RX ADMIN — POTASSIUM BICARBONATE 40 MEQ: 782 TABLET, EFFERVESCENT ORAL at 09:32

## 2022-09-09 RX ADMIN — SODIUM CHLORIDE, PRESERVATIVE FREE 10 ML: 5 INJECTION INTRAVENOUS at 06:10

## 2022-09-09 RX ADMIN — ALBUMIN (HUMAN) 25 G: 0.25 INJECTION, SOLUTION INTRAVENOUS at 14:52

## 2022-09-09 RX ADMIN — SODIUM CHLORIDE, PRESERVATIVE FREE 10 ML: 5 INJECTION INTRAVENOUS at 22:32

## 2022-09-09 RX ADMIN — ALBUMIN (HUMAN) 25 G: 0.25 INJECTION, SOLUTION INTRAVENOUS at 02:04

## 2022-09-09 RX ADMIN — SODIUM CHLORIDE, PRESERVATIVE FREE 10 ML: 5 INJECTION INTRAVENOUS at 06:09

## 2022-09-09 RX ADMIN — LORAZEPAM 2 MG: 1 TABLET ORAL at 02:05

## 2022-09-09 RX ADMIN — FUROSEMIDE 20 MG: 10 INJECTION, SOLUTION INTRAMUSCULAR; INTRAVENOUS at 22:32

## 2022-09-09 RX ADMIN — ASPIRIN 81 MG: 81 TABLET, CHEWABLE ORAL at 09:32

## 2022-09-09 RX ADMIN — IPRATROPIUM BROMIDE 0.5 MG: 0.5 SOLUTION RESPIRATORY (INHALATION) at 07:31

## 2022-09-09 RX ADMIN — ACETAMINOPHEN 650 MG: 325 TABLET ORAL at 22:31

## 2022-09-09 RX ADMIN — PANTOPRAZOLE SODIUM 40 MG: 40 TABLET, DELAYED RELEASE ORAL at 22:32

## 2022-09-09 RX ADMIN — ALBUMIN (HUMAN) 25 G: 0.25 INJECTION, SOLUTION INTRAVENOUS at 19:33

## 2022-09-09 RX ADMIN — ENOXAPARIN SODIUM 40 MG: 100 INJECTION SUBCUTANEOUS at 09:32

## 2022-09-09 RX ADMIN — PANTOPRAZOLE SODIUM 40 MG: 40 TABLET, DELAYED RELEASE ORAL at 09:33

## 2022-09-09 RX ADMIN — ALBUMIN (HUMAN) 25 G: 0.25 INJECTION, SOLUTION INTRAVENOUS at 06:09

## 2022-09-09 RX ADMIN — SERTRALINE HYDROCHLORIDE 25 MG: 50 TABLET ORAL at 09:00

## 2022-09-09 RX ADMIN — POTASSIUM BICARBONATE 40 MEQ: 782 TABLET, EFFERVESCENT ORAL at 22:31

## 2022-09-09 NOTE — PROGRESS NOTES
Hospitalist Progress Note    Patient: Cliff Quevedo MRN: [id number]  [facility]: [id number]    YOB: 1955  Age: 74 y.o. Sex: female    DOA: 9/8/2022 LOS:  LOS: 1 day          Chief Complaint:    acute CHF exacerbation    Assessment/Plan     81-year-old female with CAD, COPD, CHF, chronic leukocytosis, chronic alcohol use, memory impairment, and tobacco use is admitted with acute CHF exacerbation with bilateral lower extremity swelling, hypoalbuminemia due to protein-calorie malnutrition, and hepatic steatosis. Acute CHF exacerbation with elevated troponin  --Telemetry  --Trend troponins, are downtrending  --Cardiology consult  --IV lasix 20 mg BID  --Echocardiogram to assess EF  --RUQ ultrasound to evaluate for cirrhosis given longstanding alcohol use     Chronic leukocytosis-has not seen heme/onc for a workup. No active sign of infection. She has hepatosplenomegaly noted on CT abdomen. --Oncology consult    Anemia   -Hgb 8.9 this AM   -check iron studies and stool occult     Elevated d-dimer -  CTA chest neg for PE  PVL studies neg for DVT     Hypoalbuminemia due to protein calorie malnutrition-contributing to worsening leg swelling. She has poor appetite.   --Already on Boost supplementation  --Albumin infusions     Hepatic steatosis-suspect possible cirrhosis as well  --Follow up RUQ ultrasound  --Hepatology consult if cirrhosis is present     Noncompliance with medications-was prescribed lasix for CHF but stopped taking it and has not followed up with cardiology     Chronic longstanding alcohol use-she drinks 3-4 beers nightly  --[facility] protocol to monitor for alcohol withdrawal     COPD without exacerbation  --Atrovent nebs twice daily     Smoker  --Declines nicotine patch     CAD  --Continue daily ASA  --Check lipid panel  --Start statin for discharge     Cognitive impairment-chronic, lives with her daughter  --Check ammonia level     Full code  --Palliative care consult given overall poor prognosis     Prophylaxis: protonix PO, lovenox SQ  Disposition :  Patient Active Problem List   Diagnosis Code    CAD (coronary artery disease) I25.10    Memory impairment R41.3    COPD (chronic obstructive pulmonary disease) ([facility]) J44.9    Acute exacerbation of CHF (congestive heart failure) ([facility]) I50.9    Leukocytosis D72.829    Alcohol use Z72.89    Smoker F17.200    Poor historian Z78.9    Hepatic steatosis K76.0    Noncompliance with medications Z91.14    Elevated troponin R77.8    Hypoalbuminemia due to protein-calorie malnutrition ([facility]) E88.09, E46    Anemia D64.9       Subjective:    No complaints this AM   Very limited speech   Sitting up in bed     Review of systems:    Constitutional: denies fevers, chills, myalgias  Respiratory: denies SOB, cough  Cardiovascular: denies chest pain, palpitations  Gastrointestinal: denies nausea, vomiting, diarrhea      Vital signs/Intake and Output:  Visit Vitals  /60   Pulse 86   Temp 98 °F (36.7 °C)   Resp 16   Ht 5' 1\" (1.549 m)   Wt 56.2 kg (124 lb)   SpO2 100%   BMI 23.43 kg/m²     Current Shift:  No intake/output data recorded. Last three shifts:  No intake/output data recorded.     Exam:    General: Well developed, alert, NAD, OX3  Head/Neck: NCAT, supple, No masses, No lymphadenopathy  CVS:Regular rate and rhythm, no M/R/G, S1/S2 heard, no thrill  Lungs:Clear to auscultation bilaterally, no wheezes, rhonchi, or rales  Abdomen: Soft, Nontender, No distention, Normal Bowel sounds, No hepatomegaly  Extremities: No C/C/E, pulses palpable 2+  Skin:normal texture and turgor, no rashes, no lesions  Neuro:grossly normal , follows commands  Psych:appropriate                Labs: Results:       Chemistry Recent Labs     09/09/22  0140 09/08/22  1630   GLU 94 97    139   K 3.5 3.7    107   CO2 26 25   BUN 6* 6*   CREA 0.86 0.79   CA 7.8* 8.3*   AGAP 6 7   BUCR 7* 8*   * 265*   TP 7.0 8.5*   ALB 1.4* 1.8*   GLOB 5.6* 6.7*   AGRAT 0.3* 0.3*      CBC w/Diff Recent Labs     09/09/22  0140 09/08/22  1630   WBC 15.9* 21.3*   RBC 2.69* 3.18*   HGB 8.9* 10.6*   HCT 25.7* 30.4*    198   GRANS  --  81*   LYMPH  --  8*   EOS  --  4      Cardiac Enzymes No results for input(s): CPK, CKND1, AURELIA in the last 72 hours. No lab exists for component: CKRMB, TROIP   Coagulation No results for input(s): PTP, INR, APTT, INREXT, INREXT in the last 72 hours. Lipid Panel Lab Results   Component Value Date/Time    Cholesterol, total 88 09/09/2022 01:40 AM    HDL Cholesterol 21 (L) 09/09/2022 01:40 AM    LDL, calculated 49.6 09/09/2022 01:40 AM    VLDL, calculated 17.4 09/09/2022 01:40 AM    Triglyceride 87 09/09/2022 01:40 AM    CHOL/HDL Ratio 4.2 09/09/2022 01:40 AM      BNP No results for input(s): BNPP in the last 72 hours.    Liver Enzymes Recent Labs     09/09/22  0140   TP 7.0   ALB 1.4*   *      Thyroid Studies No results found for: T4, T3U, TSH, TSHEXT, TSHEXT     Procedures/imaging: see electronic medical records for all procedures/Xrays and details which were not copied into this note but were reviewed prior to creation of Summer Atkinson MD

## 2022-09-09 NOTE — ACP (ADVANCE CARE PLANNING)
Advance Care Planning     General Advance Care Planning (ACP) Conversation      Date of Conversation: 9/9/2022  Conducted with: Patient with Decision Making Capacity    Healthcare Decision Maker:     Primary Decision Maker: Hans Castellanos - Child - [phone]    Today we documented Decision Maker(s) consistent with Legal Next of Kin hierarchy. Content/Action Overview:   Has NO ACP documents/care preferences. Too fatigued for extended conversations today  Did not have code status discussion today because of drowsiness. Will continue to follow for goals of care discussions    Length of Voluntary ACP Conversation in minutes:  <16 minutes (Non-Billable)    9/9/2022 0930 Seen today in room 337 along with Dora Peralta NP. Lying in bed with head of bed elevated. Nurse in room administering medications. When taking her PO meds, she required multiple drinks of water. No coughing. Had difficulty manipulating the straw. Answered questions with 1-2 word answers but would not engage in conversation. Worked to get comfortbale in bed and states she did not need help but moved very slowly. Came to the ED on 9/8/2022 from home per POV for bilateral leg pain/swelling x 2 weeks    Admitted to telemetry for acute CHF with elevated troponin (trend troponins, cardiology consultation, ECHO, diuresis), chronic leukocytosis (hematology consultation ), hypoalbuminemia ( nutritional support, IV albumin), hepatic steatosis (RUQ ultrasound, possible hepatology consultation based on US results), chronic alcohol use ([facility] protocol), COPD (nebs)    PMH (from record review) significant for COPD, CHF,CAD, mild cognitive impairment, leukocytosis    The palliative care team has been consulted for code status discussion. Introduced the role of palliative medicine for the hospitalized patient. Lives with her daughter, Zulema Barron. Not able to give good description of her level of independence.      She states she has three children but did not give their names. She became more sleepy and did not want to continue conversations with palliative team.FULL CODE order placed on admission. Will work to confirm with patient and daughter during her admission. 1220: telephone call to Manuel Snow, daughter, requesting call back. Disposition plan: to be determined based on response to treatment and patient/family decisions    Palliative care will continue to follow Shira Kent  and her family during her hospitalization and support them as they make healthcare decisions and define goals of care.       Lary Davis RN, MSN  Palliative Medicine  P: [phone]

## 2022-09-09 NOTE — PROGRESS NOTES
Reason for Admission:   Elevated troponin [R77.8]    PCP: Rai NAGEL, TAN    There are currently no Active Isolations  No active infections                  RUR Score:     10%             Resources/supports,as identified by patient/family:     Lives with daughter    Barriers to discharge:             Plan for utilizing home health:    yes                          Likelihood of readmission:   10%         Transition of Care Plan:    home with home health vs. Snf depending upon clinical progression and therapy recommendations    Initial assessment completed with patient. Cognitive status of patient: oriented to time, place, person and situation. Face sheet information confirmed:  yes. The patient designates her daughter Nolvia Harvey [phone] to participate in her discharge plan and to receive any needed information. This patient lives in a apartment with patient and daughter. Patient is able to navigate steps as needed. Prior to hospitalization, patient was considered to be independent with ADLs/IADLS : yes . The patient and daughter will be available to transport patient home upon discharge. The patient already has none reported medical equipment available in the home. Patient is not currently active with home health. Patient has not stayed in a skilled nursing facility or rehab. Was  stay within last 60 days : no. This patient is on dialysis/days :no    Currently, the discharge plan is Home with [address] with family assistance. The patient states that she can obtain her medications from the pharmacy, and take her medications as directed. Patient's current insurance is Payor: [facility] MEDICARE / Plan: [address] [facility], [place] CCP / Product Type: Managed Care Medicare /        Care Management Interventions  PCP Verified by CM:  Yes  Transition of Care Consult (CM Consult): Discharge Planning  Support Systems: Child(reji)  Confirm Follow Up Transport: Family  The Plan for Transition of Care is Related to the Following Treatment Goals : home with [place] versus home with family assistance  Discharge Location  Patient Expects to be Discharged to[de-identified] Home with home health

## 2022-09-09 NOTE — PROGRESS NOTES
Pageameena  Doctor  [facility] AT [place] about patients condition. . Patient seems a little uneasy to arouse. Patient is able to answer orientation questions .

## 2022-09-09 NOTE — CONSULTS
Patient presents to Urgent Care with dadGeorge for complaints of sore throat, cough, and had close contact exposure at school to COVID 19. Symptoms started yesterday.    OTC: Mucinex, Advil    Nurse: N95 mask, face shield, gloves, gown, and goggles.  Patient and dad: Wore mask     Phone: [phone]  Paging : [phone]     Hematology/Oncology Consult Note    Patient: Jenny Modi MRN: [id number]  CSN: [id number]    YOB: 1955  Age: 74 y.o. Sex: female    DOA: 9/8/2022 LOS:  LOS: 1 day            REASON FOR CONSULTATION:     Leukocytosis    ASSESSMENT:     Hem/Onc Problems:(1) Leukocytosis, chronic, no evidence of infection; (2) Hyperglobulinemia    Reason for Admission: SOB CHF exacerbation  Other Active Problems:       Active Problems:  Hospital Problems  Date Reviewed: 9/8/2022            Codes Class Noted POA    Poor historian ICD-10-CM: Z78.9  ICD-9-CM: V49.89  9/9/2022 Yes        Hepatic steatosis ICD-10-CM: K76.0  ICD-9-CM: 571.8  9/9/2022 Yes        Noncompliance with medications ICD-10-CM: Z91.14  ICD-9-CM: V15.81  9/9/2022 Yes        Elevated troponin ICD-10-CM: R77.8  ICD-9-CM: 790.6  9/9/2022 Yes        Hypoalbuminemia due to protein-calorie malnutrition ([facility] ICD-10-CM: E88.09, E46  ICD-9-CM: 273.8, 263.9  9/9/2022 Yes        CAD (coronary artery disease) ICD-10-CM: I25.10  ICD-9-CM: 414.00  9/8/2022 Yes        Memory impairment ICD-10-CM: R41.3  ICD-9-CM: 780.93  9/8/2022 Yes        COPD (chronic obstructive pulmonary disease) ([facility] ICD-10-CM: J44.9  ICD-9-CM: 172  9/8/2022 Yes        * (Principal) Acute exacerbation of CHF (congestive heart failure) ([facility] ICD-10-CM: I50.9  ICD-9-CM: 428.0  9/8/2022 Yes        Leukocytosis ICD-10-CM: N73.654  ICD-9-CM: 288.60  9/8/2022 Yes        Alcohol use ICD-10-CM: Z72.89  ICD-9-CM: V49.89  9/8/2022 Yes        Smoker ICD-10-CM: P00.367  ICD-9-CM: 305.1  9/8/2022 Yes             RECOMMENDATIONS:   For leukocytosis, there is evidence of infection or steroid use. It has been chronic and has not trended up much. I don't think this warrants CML or CLL work up yet. I will obtain differentials and follow up trend to decide on future course of action.    For anemia, I will order iron panels, B12/folate acid, Hgb electrophoresis. For her hypergobulinemia, I will get ESR, CRP, immunoglobulin level, SPEP, free light chains to rule out MM  Other managements per primary team        HPI:     Hong Keys is a 74 y.o., BLACK/, female, who I have been asked to see for leukocytosis. She does not converse much, history by chart review. She has history of CAD, COPD, CHF, chronic leukocytosis, chronic alcohol use, memory impairment, and tobacco use . She presented to ED for leg swelling, abdominal swelling, shortness of breath, chest pain, and fatigue worsening over the past several months. Her legs and abdomen have swelled especially in the last few weeks. She has had some shortness of breath with chest pain for months as well. She was prescribed lasix but stopped taking it and hasn't followed up with cardiology (unsure how long ago). She lives with her daughter. She drinks 3-4 beers per night to help her sleep for many years. She is a very poor historian and has trouble any answering questions with vague responses. She was noted to have luekocytosis, WBC 15.9 0 no differentials available today; it was 20 yesterday. She denies fever. She cannot tell me if she has taken steroid. In review of labs also noted elevated globulin 5.6, and anemia with hgb 8.9 with MCV 95.5.      Past Medical History:   Diagnosis Date    Alcohol use 9/8/2022    CAD (coronary artery disease) 9/8/2022    Chronic obstructive pulmonary disease (HCC)     COPD (chronic obstructive pulmonary disease) ([facility] 9/8/2022    Heart failure ([facility]     Hypertension     Memory impairment 9/8/2022    Smoker 9/8/2022       Past Surgical History:   Procedure Laterality Date    HX GYN         Family History   Problem Relation Age of Onset    Hypertension Mother     Elevated Lipids Mother     Heart Disease Mother     Diabetes Mother     Hypertension Father     Heart Disease Father     Elevated Lipids Father     Kidney Disease Father     Seizures Daughter        Social History     Socioeconomic History    Marital status:    Tobacco Use    Smoking status: Every Day     Packs/day: 0.50     Types: Cigarettes    Smokeless tobacco: Never   Substance and Sexual Activity    Alcohol use:  Yes     Alcohol/week: 7.0 standard drinks     Types: 7 Cans of beer per week       Current Facility-Administered Medications   Medication Dose Route Frequency    pantoprazole (PROTONIX) tablet 40 mg  40 mg Oral BID    sertraline (ZOLOFT) tablet 25 mg  25 mg Oral DAILY    carvediloL (COREG) tablet 3.125 mg  3.125 mg Oral BID WITH MEALS    aspirin chewable tablet 81 mg  81 mg Oral DAILY    furosemide (LASIX) injection 20 mg  20 mg IntraVENous BID    potassium bicarb-citric acid (EFFER-K) tablet 40 mEq  40 mEq Oral BID    albumin human 25% (BUMINATE) solution 25 g  25 g IntraVENous Q6H    ipratropium (ATROVENT) 0.02 % nebulizer solution 0.5 mg  0.5 mg Nebulization BID RT    sodium chloride (NS) flush 5-40 mL  5-40 mL IntraVENous Q8H    sodium chloride (NS) flush 5-40 mL  5-40 mL IntraVENous PRN    acetaminophen (TYLENOL) tablet 650 mg  650 mg Oral Q6H PRN    Or    acetaminophen (TYLENOL) suppository 650 mg  650 mg Rectal Q6H PRN    polyethylene glycol (MIRALAX) packet 17 g  17 g Oral DAILY PRN    ondansetron (ZOFRAN ODT) tablet 4 mg  4 mg Oral Q8H PRN    Or    ondansetron (ZOFRAN) injection 4 mg  4 mg IntraVENous Q6H PRN    enoxaparin (LOVENOX) injection 40 mg  40 mg SubCUTAneous DAILY    sodium chloride (NS) flush 5-40 mL  5-40 mL IntraVENous Q8H    sodium chloride (NS) flush 5-40 mL  5-40 mL IntraVENous PRN    LORazepam (ATIVAN) tablet 1 mg  1 mg Oral Q1H PRN    Or    LORazepam (ATIVAN) 2 mg/mL injection 1 mg  1 mg IntraVENous Q1H PRN    LORazepam (ATIVAN) tablet 2 mg  2 mg Oral Q1H PRN    Or    LORazepam (ATIVAN) 2 mg/mL injection 2 mg  2 mg IntraVENous Q1H PRN    LORazepam (ATIVAN) 2 mg/mL injection 3 mg  3 mg IntraVENous Q15MIN PRN       Prior to Admission medications Medication Sig Start Date End Date Taking? Authorizing Provider   pantoprazole (PROTONIX) 40 mg tablet Take 40 mg by mouth two (2) times a day. Yes Provider, Historical   tiotropium (Spiriva with HandiHaler) 18 mcg inhalation capsule Take 1 Capsule by inhalation daily. Yes Provider, Historical   albuterol (PROVENTIL HFA, VENTOLIN HFA, PROAIR HFA) 90 mcg/actuation inhaler Take  by inhalation. Indications: chronic obstructive pulmonary disease   Yes Provider, Historical       No Known Allergies      ROS:     CONSTITUTION: No fevers, chills, night sweats, anorexia, or weight loss. HEENT: No visual changes. No change in hearing acuity, tinnitus, hoarseness, sore throat, or postnasal drip. CARDIOVASCULAR: No chest pain, palpitations, or syncope. No extremity edema. RESPIRATORY: No shortness of breath, cough, wheezing, or hemoptysis. GASTROINTESINAL: No dysphagia, odynophagia, nausea, or vomiting. No reflux symptoms, abdominal pain, or bloating. No constipation, diarrhea, or rectal bleeding. GENITOURINARY: no complaints. NEUROLOGICALl: No diplopia, dysarthria, weakness, or headaches. No seizures, focal weakness, or abnormal gait. PSYCHIATRIC: No anxiety, depression, or memory loss. HEMATOLOGIC: No easy bruising. No unusual bleeding. No enlarged lymph nodes. MUSCULOSKELETAL: No unusual joint or muscle ache. SKIN: No rash or pruritus.     Physical Exam:      VITAL SIGNS: Patient Vitals for the past 24 hrs:   BP Temp Pulse Resp SpO2 Height Weight   09/09/22 0839 (!) 99/48 98 °F (36.7 °C) 86 16 100 % -- --   09/09/22 0436 (!) 105/56 98.4 °F (36.9 °C) 90 16 94 % -- --   09/09/22 0015 125/71 98.1 °F (36.7 °C) 98 16 99 % -- --   09/08/22 2006 122/62 -- 96 16 98 % -- --   09/08/22 1908 119/64 -- 100 16 99 % -- --   09/08/22 1854 -- -- (!) 101 15 98 % -- --   09/08/22 1851 -- -- (!) 103 -- -- -- --   09/08/22 1836 -- -- (!) 105 14 100 % -- --   09/08/22 1806 -- -- (!) 101 18 100 % -- --   09/08/22 1800 -- -- (!) 108 18 100 % -- --   09/08/22 1730 112/60 -- 98 18 96 % -- --   09/08/22 1642 -- -- (!) 106 16 98 % -- --   09/08/22 1627 117/67 -- -- -- -- -- --   09/08/22 1550 112/65 97.7 °F (36.5 °C) (!) 106 20 98 % 5' 1\" (1.549 m) 56.2 kg (124 lb)     GENERAL: normal appearance, no acute distress. HEENT: conjunctivae normal, eyelids normal, PERRLA, anicteric, external ears and nose normal, hearing grossly normal.   NECK: supple, no masses. LUNG: breathing comfortably, lungs clear to auscultation and percussion. CARDIOVASCULAR: normal heart sounds, heart rhythm regular, no peripheral edema. ABDOMEN: soft. bowel sounds normal, non-tender non distension, no hepatosplenomegaly  PELVIS: pelvic exam deferred. RECTUM: rectal exam deferred. LYMPH NODES: no cervical adenopathy, no axillary adenopathy, no supraclavicular adenopathy, no infraclavicular adenopathy. SKIN: no rash, no petechiae, no ecchymosis, no pallor, no cutaneous nodules. MUSCULOSKELETAL: normal muscle strength. NEUROLOGIC: no focal motor deficit, normal gait, no abnormal mental status. PSYCHIATRIC: oriented to person, time and place, mood and affect appropriate to situation.       Ancillary Studies:     Bloodwork:  Recent Results (from the past 24 hour(s))   EKG, 12 LEAD, INITIAL    Collection Time: 09/08/22  4:28 PM   Result Value Ref Range    Ventricular Rate 111 BPM    Atrial Rate 111 BPM    P-R Interval 122 ms    QRS Duration 68 ms    Q-T Interval 318 ms    QTC Calculation (Bezet) 432 ms    Calculated P Axis 49 degrees    Calculated R Axis 43 degrees    Calculated T Axis 49 degrees    Diagnosis       Poor data quality, interpretation may be adversely affected  Sinus tachycardia  Poor R Wave Progression  Abnormal ECG  Confirmed by Michael De La Cruz MD, [facility] ([id number]) on 9/8/2022 10:08:22 PM     CBC WITH AUTOMATED DIFF    Collection Time: 09/08/22  4:30 PM   Result Value Ref Range    WBC 21.3 (H) 4.6 - 13.2 K/uL    RBC 3.18 (L) 4.20 - 5.30 M/uL    HGB 10.6 (L) 12.0 - 16.0 g/dL    HCT 30.4 (L) 35.0 - 45.0 %    MCV 95.6 78.0 - 100.0 FL    MCH 33.3 24.0 - 34.0 PG    MCHC 34.9 31.0 - 37.0 g/dL    RDW 17.8 (H) 11.6 - 14.5 %    PLATELET 352 929 - 110 K/uL    MPV 11.7 9.2 - 11.8 FL    NRBC 0.0 0  WBC    ABSOLUTE NRBC 0.00 0.00 - 0.01 K/uL    NEUTROPHILS 81 (H) 40 - 73 %    BAND NEUTROPHILS 2 0 - 5 %    LYMPHOCYTES 8 (L) 21 - 52 %    MONOCYTES 5 3 - 10 %    EOSINOPHILS 4 0 - 5 %    BASOPHILS 0 0 - 2 %    IMMATURE GRANULOCYTES 0 %    ABS. NEUTROPHILS 17.6 (H) 1.8 - 8.0 K/UL    ABS. LYMPHOCYTES 1.7 0.9 - 3.6 K/UL    ABS. MONOCYTES 1.1 0.05 - 1.2 K/UL    ABS. EOSINOPHILS 0.9 (H) 0.0 - 0.4 K/UL    ABS. BASOPHILS 0.0 0.0 - 0.1 K/UL    ABS. IMM. GRANS. 0.0 K/UL    DF MANUAL      PLATELET COMMENTS LARGE PLATELETS      RBC COMMENTS ANISOCYTOSIS  1+        RBC COMMENTS TARGET CELLS  1+        WBC COMMENTS VACUOLATED POLYS     METABOLIC PANEL, COMPREHENSIVE    Collection Time: 09/08/22  4:30 PM   Result Value Ref Range    Sodium 139 136 - 145 mmol/L    Potassium 3.7 3.5 - 5.5 mmol/L    Chloride 107 100 - 111 mmol/L    CO2 25 21 - 32 mmol/L    Anion gap 7 3.0 - 18 mmol/L    Glucose 97 74 - 99 mg/dL    BUN 6 (L) 7.0 - 18 MG/DL    Creatinine 0.79 0.6 - 1.3 MG/DL    BUN/Creatinine ratio 8 (L) 12 - 20      GFR est AA >60 >60 ml/min/1.73m2    GFR est non-AA >60 >60 ml/min/1.73m2    Calcium 8.3 (L) 8.5 - 10.1 MG/DL    Bilirubin, total 3.6 (H) 0.2 - 1.0 MG/DL    ALT (SGPT) 14 13 - 56 U/L    AST (SGOT) 37 10 - 38 U/L    Alk.  phosphatase 265 (H) 45 - 117 U/L    Protein, total 8.5 (H) 6.4 - 8.2 g/dL    Albumin 1.8 (L) 3.4 - 5.0 g/dL    Globulin 6.7 (H) 2.0 - 4.0 g/dL    A-G Ratio 0.3 (L) 0.8 - 1.7     NT-PRO BNP    Collection Time: 09/08/22  4:30 PM   Result Value Ref Range    NT pro-BNP 1,658 (H) 0 - 900 PG/ML   TROPONIN-HIGH SENSITIVITY    Collection Time: 09/08/22  4:30 PM   Result Value Ref Range    Troponin-High Sensitivity 72 (H) 0 - 54 ng/L   D DIMER    Collection Time: 09/08/22  4:30 PM   Result Value Ref Range    D DIMER 6.82 (H) <0.46 ug/ml(FEU)   URINALYSIS W/ RFLX MICROSCOPIC    Collection Time: 09/08/22  5:55 PM   Result Value Ref Range    Color DARK YELLOW      Appearance CLEAR      Specific gravity 1.015 1.005 - 1.030      pH (UA) 6.5 5.0 - 8.0      Protein Negative NEG mg/dL    Glucose Negative NEG mg/dL    Ketone Negative NEG mg/dL    Bilirubin SMALL (A) NEG      Blood Negative NEG      Urobilinogen 4.0 (H) 0.2 - 1.0 EU/dL    Nitrites Negative NEG      Leukocyte Esterase SMALL (A) NEG     URINE MICROSCOPIC ONLY    Collection Time: 09/08/22  5:55 PM   Result Value Ref Range    WBC 1 to 3 0 - 5 /hpf    RBC Negative 0 - 5 /hpf    Epithelial cells FEW 0 - 5 /lpf    Bacteria FEW (A) NEG /hpf   TROPONIN-HIGH SENSITIVITY    Collection Time: 09/08/22  7:07 PM   Result Value Ref Range    Troponin-High Sensitivity 64 (H) 0 - 54 ng/L   EKG, 12 LEAD, SUBSEQUENT    Collection Time: 09/08/22  7:15 PM   Result Value Ref Range    Ventricular Rate 99 BPM    Atrial Rate 99 BPM    P-R Interval 108 ms    QRS Duration 70 ms    Q-T Interval 344 ms    QTC Calculation (Bezet) 441 ms    Calculated P Axis 64 degrees    Calculated R Axis 35 degrees    Calculated T Axis 47 degrees    Diagnosis       Sinus rhythm with short DE  Otherwise normal ECG  Confirmed by Jeffery Duarte MD, [facility] ([id number]) on 9/8/2022 10:07:10 PM     LACTIC ACID    Collection Time: 09/08/22  8:15 PM   Result Value Ref Range    Lactic acid 1.0 0.4 - 2.0 MMOL/L   METABOLIC PANEL, COMPREHENSIVE    Collection Time: 09/09/22  1:40 AM   Result Value Ref Range    Sodium 139 136 - 145 mmol/L    Potassium 3.5 3.5 - 5.5 mmol/L    Chloride 107 100 - 111 mmol/L    CO2 26 21 - 32 mmol/L    Anion gap 6 3.0 - 18 mmol/L    Glucose 94 74 - 99 mg/dL    BUN 6 (L) 7.0 - 18 MG/DL    Creatinine 0.86 0.6 - 1.3 MG/DL    BUN/Creatinine ratio 7 (L) 12 - 20      GFR est AA >60 >60 ml/min/1.73m2    GFR est non-AA >60 >60 ml/min/1.73m2    Calcium 7.8 (L) 8.5 - 10.1 MG/DL    Bilirubin, total 2.9 (H) 0.2 - 1.0 MG/DL    ALT (SGPT) 12 (L) 13 - 56 U/L    AST (SGOT) 35 10 - 38 U/L    Alk. phosphatase 210 (H) 45 - 117 U/L    Protein, total 7.0 6.4 - 8.2 g/dL    Albumin 1.4 (L) 3.4 - 5.0 g/dL    Globulin 5.6 (H) 2.0 - 4.0 g/dL    A-G Ratio 0.3 (L) 0.8 - 1.7     CBC W/O DIFF    Collection Time: 09/09/22  1:40 AM   Result Value Ref Range    WBC 15.9 (H) 4.6 - 13.2 K/uL    RBC 2.69 (L) 4.20 - 5.30 M/uL    HGB 8.9 (L) 12.0 - 16.0 g/dL    HCT 25.7 (L) 35.0 - 45.0 %    MCV 95.5 78.0 - 100.0 FL    MCH 33.1 24.0 - 34.0 PG    MCHC 34.6 31.0 - 37.0 g/dL    RDW 18.0 (H) 11.6 - 14.5 %    PLATELET 486 194 - 951 K/uL    MPV 12.1 (H) 9.2 - 11.8 FL    NRBC 0.0 0  WBC    ABSOLUTE NRBC 0.00 0.00 - 0.01 K/uL   TROPONIN-HIGH SENSITIVITY    Collection Time: 09/09/22  1:40 AM   Result Value Ref Range    Troponin-High Sensitivity 61 (H) 0 - 54 ng/L   TROPONIN-HIGH SENSITIVITY    Collection Time: 09/09/22  7:15 AM   Result Value Ref Range    Troponin-High Sensitivity 61 (H) 0 - 54 ng/L       Radiology:   CTA CHEST W OR W WO CONT    Result Date: 9/8/2022  EXAM: CTA CHEST W OR W WO CONT CLINICAL INDICATION/HISTORY: BLE swelling, intermittent CP and SOB, elevated d dimer -Additional: None COMPARISON: None TECHNIQUE: Axial CT imaging from the thoracic inlet through the diaphragm with intravenous contrast. Coronal and sagittal MIP reformats were generated. One or more dose reduction techniques were used on this CT: automated exposure control, adjustment of the mAs and/or kVp according to patient size, and iterative reconstruction techniques. The specific techniques used on this CT exam have been documented in the patient's electronic medical record.  Digital Imaging and Communications in Medicine (DICOM) format image data are available to nonaffiliated external healthcare facilities or entities on a secure, media free, reciprocally searchable basis with patient authorization for at least a 12-month period after this study. _______________ FINDINGS: EXAM QUALITY: Adequate bolus timing with mild respiratory motion artifact degradation. PULMONARY ARTERIES: No central, interlobar or visualized segmental branch pulmonary arterial embolus. Normal caliber main pulmonary artery. MEDIASTINUM: Cardiomegaly without pericardial effusion. Trivial atherosclerotic calcifications of the transverse aortic arch. LUNGS: Multifocal bilateral lower lobe and right middle lobe bandlike and linear atelectasis. No consolidation. Shaaron Money PLEURA: No pneumothorax or effusion. AIRWAY: Normal. LYMPH NODES: Nonspecific Right hilar 1.6 cm enlarged lymph node. UPPER ABDOMEN: Small volume upper abdominal ascites and mesenteric fluid. Prior cholecystectomy. Diffusely heterogeneous enhancing appearance of the liver which is likely secondary to bolus timing artifact. . OTHER: No acute or aggressive osseous abnormalities identified. _______________     1. No evidence of pulmonary embolism. 2.  Cardiomegaly with Multifocal bilateral lower lobe and right middle lobe bandlike and linear atelectasis. Asymmetric enlarged right hilar lymph node, potentially reactive. 3. Nonspecific small volume of abdominal ascites. CT ABD PELV WO CONT    Result Date: 9/8/2022  EXAM: CT ABD PELV WO CONT CLINICAL INDICATION/HISTORY: leukocytosis and elevated bilirubin, ascites seen on CTA chest -Additional: None COMPARISON: CTA chest from same day TECHNIQUE: Axial CT imaging of the abdomen and pelvis was performed without intravenous contrast. Multiplanar reformats were generated. One or more dose reduction techniques were used on this CT: automated exposure control, adjustment of the mAs and/or kVp according to patient size, and iterative reconstruction techniques. The specific techniques used on this CT exam have been documented in the patient's electronic medical record.  Digital Imaging and Communications in Medicine (DICOM) format image data are available to nonaffiliated external healthcare facilities or entities on a secure, media free, reciprocally searchable basis with patient authorization for at least a 12-month period after this study. _______________ FINDINGS: LOWER CHEST: Multifocal bibasilar bandlike and linear atelectasis. LIVER, BILIARY: Mild hepatomegaly with diffuse heterogeneous attenuation. No biliary dilation. Prior cholecystectomy. PANCREAS: Unremarkable SPLEEN: Splenomegaly measuring 14.6 cm in maximal dimension. ADRENALS: Normal. KIDNEYS, URETERS, BLADDER: Symmetric excretion of intravenous contrast from preceding same day CT chest examination. No findings of hydronephrosis. Unremarkable bladder PELVIC ORGANS: Unremarkable. GASTROINTESTINAL TRACT: Degraded evaluation secondary absence of contrast. No bowel dilation or wall thickening. Colonic diverticulosis, no CT evidence of acute diverticulitis. LYMPH NODES: No enlarged lymph nodes. VASCULATURE: Atherosclerotic calcification without aneurysm. OSSEOUS: No acute or aggressive osseous abnormalities identified. OTHER: Small volume abdominal and pelvic ascites with mild diffuse soft tissue anasarca. . _______________     1. Hepatosplenomegaly with findings suggestive of steatosis. Nonspecific small volume abdominal pelvic ascites and soft tissue anasarca. Diagnostic considerations may represent sequela of chronic liver disease, anemia/hypoproteinemia. XR CHEST PORT    Result Date: 9/8/2022  EXAM: XR CHEST PORT CLINICAL INDICATION/HISTORY: CP, SOB, BLE edema -Additional: None COMPARISON: None TECHNIQUE: Frontal view of the chest _______________ FINDINGS: HEART AND MEDIASTINUM: Midline cardiac silhouette, normal in size. Unremarkable hilar vascular structures. LUNGS AND PLEURAL SPACES: Bibasilar discoid interstitial opacities with mild diffuse interstitial prominence. No pneumothorax or effusion. BONY THORAX AND SOFT TISSUES: No acute or destructive osseous abnormality. _______________     1.  Bibasilar atelectasis and/or scarring, with nonspecific interstitial prominence. Differential considerations may include early interstitial edema versus chronic versus reactive small airways process. DUPLEX LOWER EXT VENOUS BILAT    Result Date: 9/8/2022  · No evidence of deep vein thrombosis in the right lower extremity. · No evidence of deep vein thrombosis in the left lower extremity. Thanks for the consult. We will follow! Bradley Garcia.  Ryan Jeter MD, PhD, [address]  Phone: [phone]  Pager: [phone]

## 2022-09-09 NOTE — CONSULTS
Palliative Medicine Consult    Patient Name: Hong Keys  YOB: 1955    Date of Initial Consult: 9/9/2022  Reason for Consult: Goals of care discussions  Requesting Provider: Dr. lAejandra Carter  Primary Care Physician: Samina Otoole PA-C      SUMMARY:   Hong Keys is a 74 y.o. with a past history of ,CAD, COPD, CHF, chronic leukocytosis, chronic alcohol use, memory impairment, and tobacco use  who was admitted on 9/8/2022 from home with a diagnosis of acute CHF exacerbation with bilateral lower extremity swelling, hypoalbuminemia due to protein-calorie malnutrition, and hepatic steatosis. Current medical issues leading to Palliative Medicine involvement include: goals of care discussions. PALLIATIVE DIAGNOSES:   Goals of care discussions  Advance care planning  Acute on chronic CHF  Debility, protein calorie malnutrition/advanced age       PLAN:   Goals of care discussions: Palliative medicine team including Mary Montgomery RN and I met with patient at patient's bedside. Patient is awake, drowsy, not engaging in goals of care discussions. She is able to tell us she came to the hospital because her legs were swelling, but did not offer any further details. She also told us that she lives with her daughter Gorge Sky, it is unclear of her baseline level of functioning. Called patient's daughter Gorge Sky, no answer, voice message left requesting a call back. At this time by chart review, patient is a full code with full interventions. Further goals of care discussions pending discussion with daughter Gorge Sky and/or patient engagement in goals of care discussions. Advance care planning: No AMD on file, patient states that she has 3 children, we have Gorge Watkins's contact information, but she was unclear regarding the names of her other children. In the state [facility], in absence of AMD documentation, patient's 3 children are legal next of kin.   Acute on chronic CHF, presented with shortness of breath and bilateral lower extremity edema, per chart review she is nonadherent to her Lasix and cardiology follow-ups as an outpatient. She also suffers from severe protein calorie malnutrition with hypoalbuminemia, resulting in third spacing. Debility, protein calorie malnutrition/advanced age: 32-year-old female who lives with her daughter Kathrine Bernstein, unsure of baseline functional status but at this time needs assistance with all ADLs. Nurses at bedside providing medications, patient appeared to have a difficult time swallowing her pills. Patient has hypoalbuminemia, and gaiting protein calorie malnutrition. She is a 32-year-old female with chronic comorbidities. Adherence to her treatment recommendations/follow-up appointments are questionable.   Initial consult note routed to primary continuity provider  Communicated plan of care with: Palliative IDT       GOALS OF CARE / TREATMENT PREFERENCES:   [====Goals of Care====]  GOALS OF CARE:Full code with full interventions  Patient/Health Care Proxy Stated Goals: Prolong life      TREATMENT PREFERENCES:   Code Status: Full Code    Advance Care Planning:  Advance Care Planning 9/9/2022   Confirm Advance Directive None   Patient Would Like to Complete Advance Directive Unable       Medical Interventions: Full interventions            The palliative care team has discussed with patient / health care proxy about goals of care / treatment preferences for patient.  [====Goals of Care====]         HISTORY:     History obtained from: patient (limited), chart    CHIEF COMPLAINT: BLE edema, Shortness of breath    HPI/SUBJECTIVE:    The patient is:   [] Verbal and participatory  [x] Non-participatory due to:   Drowsy, minimally engaged in goals of care discussions     Clinical Pain Assessment (nonverbal scale for severity on nonverbal patients):   Clinical Pain Assessment  Severity: 0            FUNCTIONAL ASSESSMENT:     Palliative Performance Scale (PPS):  PPS: 40 PSYCHOSOCIAL/SPIRITUAL SCREENING:     Advance Care Planning:  Advance Care Planning 9/9/2022   Confirm Advance Directive None   Patient Would Like to Complete Advance Directive Unable        Any spiritual / Anabaptism concerns: difficult to assess due to drowsiness  [] Yes /  [] No    Caregiver Burnout:  [] Yes /  [] No /  [x] No Caregiver Present      Anticipatory grief assessment: difficult to assess due to drowsiness  [] Normal  / [] Maladaptive               REVIEW OF SYSTEMS:     Positive and pertinent negative findings in ROS are noted above in HPI. The following systems were [x] reviewed / [] unable to be reviewed as noted in HPI  Other findings are noted below. Systems: constitutional, ears/nose/mouth/throat, respiratory, gastrointestinal, genitourinary, musculoskeletal, integumentary, neurologic, psychiatric, endocrine. Positive findings noted below. Modified ESAS Completed by: provider   Fatigue: 7 Drowsiness: 7     Pain: 0   Anxiety: 0 Nausea: 0   Anorexia: 0 Dyspnea: 0     Constipation: No              PHYSICAL EXAM:     From RN flowsheet:  Wt Readings from Last 3 Encounters:   09/09/22 56.2 kg (124 lb)     Blood pressure 114/60, pulse 86, temperature 98 °F (36.7 °C), resp. rate 16, height 5' 1\" (1.549 m), weight 56.2 kg (124 lb), SpO2 100 %.     Pain Scale 1: Numeric (0 - 10)  Pain Intensity 1: 10     Pain Location 1: Leg  Pain Orientation 1: Right, Left            Constitutional: awake, drowsy, NAD, minimally engaged in goals of care discussions  Eyes: pupils equal, anicteric  ENMT: no nasal discharge, moist mucous membranes  Cardiovascular: regular rhythm, distal pulses intact  Respiratory: breathing not labored, symmetric  Gastrointestinal: soft non-tender   Musculoskeletal: no deformity, no tenderness to palpation  Skin: warm, dry  Neurologic: following commands, moving all extremities, drowsy, difficult to assess orientation status  Psychiatric: drowsy affect, no hallucinations         HISTORY: Principal Problem:    Acute exacerbation of CHF (congestive heart failure) ([address] (9/8/2022)    Active Problems:    CAD (coronary artery disease) (9/8/2022)      Memory impairment (9/8/2022)      COPD (chronic obstructive pulmonary disease) ([address] (9/8/2022)      Leukocytosis (9/8/2022)      Alcohol use (9/8/2022)      Smoker (9/8/2022)      Poor historian (9/9/2022)      Hepatic steatosis (9/9/2022)      Noncompliance with medications (9/9/2022)      Elevated troponin (9/9/2022)      Hypoalbuminemia due to protein-calorie malnutrition ([address] (9/9/2022)      Anemia (9/9/2022)    Past Medical History:   Diagnosis Date    Alcohol use 9/8/2022    CAD (coronary artery disease) 9/8/2022    Chronic obstructive pulmonary disease ([facility]     COPD (chronic obstructive pulmonary disease) ([facility] 9/8/2022    Heart failure ([facility]     Hypertension     Memory impairment 9/8/2022    Smoker 9/8/2022      Past Surgical History:   Procedure Laterality Date    HX GYN        Family History   Problem Relation Age of Onset    Hypertension Mother     Elevated Lipids Mother     Heart Disease Mother     Diabetes Mother     Hypertension Father     Heart Disease Father     Elevated Lipids Father     Kidney Disease Father     Seizures Daughter       History reviewed, no pertinent family history. Social History     Tobacco Use    Smoking status: Every Day     Packs/day: 0.50     Types: Cigarettes    Smokeless tobacco: Never   Substance Use Topics    Alcohol use:  Yes     Alcohol/week: 7.0 standard drinks     Types: 7 Cans of beer per week     No Known Allergies   Current Facility-Administered Medications   Medication Dose Route Frequency    pantoprazole (PROTONIX) tablet 40 mg  40 mg Oral BID    sertraline (ZOLOFT) tablet 25 mg  25 mg Oral DAILY    carvediloL (COREG) tablet 3.125 mg  3.125 mg Oral BID WITH MEALS    aspirin chewable tablet 81 mg  81 mg Oral DAILY    furosemide (LASIX) injection 20 mg  20 mg IntraVENous BID    potassium bicarb-citric acid (EFFER-K) tablet 40 mEq  40 mEq Oral BID    albumin human 25% (BUMINATE) solution 25 g  25 g IntraVENous Q6H    ipratropium (ATROVENT) 0.02 % nebulizer solution 0.5 mg  0.5 mg Nebulization BID RT    acetaminophen (TYLENOL) tablet 650 mg  650 mg Oral Q6H PRN    Or    acetaminophen (TYLENOL) suppository 650 mg  650 mg Rectal Q6H PRN    polyethylene glycol (MIRALAX) packet 17 g  17 g Oral DAILY PRN    ondansetron (ZOFRAN ODT) tablet 4 mg  4 mg Oral Q8H PRN    Or    ondansetron (ZOFRAN) injection 4 mg  4 mg IntraVENous Q6H PRN    enoxaparin (LOVENOX) injection 40 mg  40 mg SubCUTAneous DAILY    sodium chloride (NS) flush 5-40 mL  5-40 mL IntraVENous Q8H    sodium chloride (NS) flush 5-40 mL  5-40 mL IntraVENous PRN    LORazepam (ATIVAN) tablet 1 mg  1 mg Oral Q1H PRN    Or    LORazepam (ATIVAN) 2 mg/mL injection 1 mg  1 mg IntraVENous Q1H PRN    LORazepam (ATIVAN) tablet 2 mg  2 mg Oral Q1H PRN    Or    LORazepam (ATIVAN) 2 mg/mL injection 2 mg  2 mg IntraVENous Q1H PRN    LORazepam (ATIVAN) 2 mg/mL injection 3 mg  3 mg IntraVENous Q15MIN PRN          LAB AND IMAGING FINDINGS:     Lab Results   Component Value Date/Time    WBC 15.9 (H) 09/09/2022 01:40 AM    HGB 8.9 (L) 09/09/2022 01:40 AM    PLATELET 606 41/34/2102 01:40 AM     Lab Results   Component Value Date/Time    Sodium 139 09/09/2022 01:40 AM    Potassium 3.5 09/09/2022 01:40 AM    Chloride 107 09/09/2022 01:40 AM    CO2 26 09/09/2022 01:40 AM    BUN 6 (L) 09/09/2022 01:40 AM    Creatinine 0.86 09/09/2022 01:40 AM    Calcium 7.8 (L) 09/09/2022 01:40 AM      Lab Results   Component Value Date/Time    Alk.  phosphatase 210 (H) 09/09/2022 01:40 AM    Protein, total 7.0 09/09/2022 01:40 AM    Albumin 1.4 (L) 09/09/2022 01:40 AM    Globulin 5.6 (H) 09/09/2022 01:40 AM     No results found for: INR, PTMR, PTP, PT1, PT2, APTT, INREXT, INREXT   Lab Results   Component Value Date/Time    Iron 80 09/09/2022 08:30 AM    TIBC 70 (L) 09/09/2022 08:30 AM    Iron % saturation 114 (H) 09/09/2022 08:30 AM    Ferritin 152 09/09/2022 08:30 AM      No results found for: PH, PCO2, PO2  No components found for: GLPOC   No results found for: CPK, CKMB             Total time: 30 minutes  Counseling / coordination time, spent as noted above:   > 50% counseling / coordination?: yes, patient, family (attempted), medical team    Prolonged service was provided for  []30 min   []75 min in face to face time in the presence of the patient, spent as noted above.   Time Start:   Time End:

## 2022-09-09 NOTE — H&P
History & Physical    Patient: Radu Truong MRN: [id number]  CSN: [id number]    YOB: 1955  Age: 74 y.o. Sex: female      DOA: 9/8/2022  Primary Care Provider:  Tavia Sutton PA-C      Assessment/Plan     Patient Active Problem List   Diagnosis Code    CAD (coronary artery disease) I25.10    Memory impairment R41.3    COPD (chronic obstructive pulmonary disease) ([facility]) J44.9    Acute exacerbation of CHF (congestive heart failure) ([facility]) I50.9    Leukocytosis D72.829    Alcohol use Z72.89    Smoker F17.200    Poor historian Z78.9    Hepatic steatosis K76.0    Noncompliance with medications Z91.14    Elevated troponin R77.8    Hypoalbuminemia due to protein-calorie malnutrition ([facility]) E88.09, E55    68-year-old female with CAD, COPD, CHF, chronic leukocytosis, chronic alcohol use, memory impairment, and tobacco use is admitted with acute CHF exacerbation with bilateral lower extremity swelling, hypoalbuminemia due to protein-calorie malnutrition, and hepatic steatosis. Acute CHF exacerbation with elevated troponin  --Telemetry  --Trend troponins, are downtrending  --Cardiology consult  --IV lasix 20 mg BID  --Echocardiogram to assess EF  --RUQ ultrasound to evaluate for cirrhosis given longstanding alcohol use    Chronic leukocytosis-has not seen heme/onc for a workup. No active sign of infection. She has hepatosplenomegaly noted on CT abdomen. --Oncology consult    Hypoalbuminemia due to protein calorie malnutrition-contributing to worsening leg swelling. She has poor appetite.   --Already on Boost supplementation  --Albumin infusions    Hepatic steatosis-suspect possible cirrhosis as well  --Follow up RUQ ultrasound  --Hepatology consult if cirrhosis is present    Noncompliance with medications-was prescribed lasix for CHF but stopped taking it and has not followed up with cardiology    Chronic longstanding alcohol use-she drinks 3-4 beers nightly  --[facility] protocol to monitor for alcohol withdrawal    COPD without exacerbation  --Atrovent nebs twice daily    Smoker  --Declines nicotine patch    CAD  --Continue daily ASA  --Check lipid panel  --Start statin for discharge    Cognitive impairment-chronic, lives with her daughter  --Check ammonia level    Full code  --Palliative care consult given overall poor prognosis    Prophylaxis: protonix PO, lovenox SQ    Estimated length of stay : 3 nights    MD Lionel Dean  -----------------------------------------------------------------------------------------------------------------------------------------------------------------------------------------------------------------------  CC: leg swelling, abdominal swelling, shortness of breath, chest pain, fatigue       HPI:     Veronica Brennan is a 74 y.o. female with CAD, COPD, CHF, chronic leukocytosis, chronic alcohol use, memory impairment, and tobacco use presents to the ED for leg swelling, abdominal swelling, shortness of breath, chest pain, and fatigue worsening over the past several months. Her legs and abdomen have swelled especially in the last few weeks. She has had some shortness of breath with chest pain for months as well. She was prescribed lasix but stopped taking it and hasn't followed up with cardiology (unsure how long ago). She lives with her daughter. She drinks 3-4 beers per night to help her sleep for many years. She is a very poor historian and has trouble any answering questions with vague responses.      Past Medical History:   Diagnosis Date    Alcohol use 9/8/2022    CAD (coronary artery disease) 9/8/2022    Chronic obstructive pulmonary disease (HCC)     COPD (chronic obstructive pulmonary disease) ([facility] [address] 9/8/2022    Heart failure ([facility] [address]     Hypertension     Memory impairment 9/8/2022    Smoker 9/8/2022       Past Surgical History:   Procedure Laterality Date    HX GYN         Family History   Problem Relation Age of Onset    Hypertension Mother     Elevated Lipids Mother     Heart Disease Mother     Diabetes Mother     Hypertension Father     Heart Disease Father     Elevated Lipids Father     Kidney Disease Father     Seizures Daughter        Social History     Socioeconomic History    Marital status:    Tobacco Use    Smoking status: Every Day     Packs/day: 0.50     Types: Cigarettes    Smokeless tobacco: Never   Substance and Sexual Activity    Alcohol use: Yes     Alcohol/week: 7.0 standard drinks     Types: 7 Cans of beer per week       Prior to Admission medications    Medication Sig Start Date End Date Taking? Authorizing Provider   pantoprazole (PROTONIX) 40 mg tablet Take 40 mg by mouth two (2) times a day. Yes Provider, Historical   tiotropium (Spiriva with HandiHaler) 18 mcg inhalation capsule Take 1 Capsule by inhalation daily. Yes Provider, Historical   albuterol (PROVENTIL HFA, VENTOLIN HFA, PROAIR HFA) 90 mcg/actuation inhaler Take  by inhalation. Indications: chronic obstructive pulmonary disease   Yes Provider, Historical       No Known Allergies    Review of Systems-limited due to poor historian  Gen: No fever, chills, +malaise, +weight loss. Heent: No headache, rhinorrhea, sore throat. Heart: +chest pain, no palpitations, [place], pnd, or orthopnea. Resp: No cough, hemoptysis, wheezing and +shortness of breath. GI: No nausea, vomiting, diarrhea, constipation. : No urinary obstruction, dysuria or hematuria. Derm: No rash, new skin lesion or pruritis. Vasc: bilateral lower extremity edema          Physical Exam:     Physical Exam:  Visit Vitals  /71   Pulse 98   Temp 98.1 °F (36.7 °C)   Resp 16   Ht 5' 1\" (1.549 m)   Wt 56.2 kg (124 lb)   SpO2 99%   BMI 23.43 kg/m²      O2 Device: None (Room air)    Temp (24hrs), Av.9 °F (36.6 °C), Min:97.7 °F (36.5 °C), Max:98.1 °F (36.7 °C)    No intake/output data recorded. No intake/output data recorded.     General:  Awake, appears older than stated age, cachectic, does not want to answer questions, no distress. Head:  Normocephalic, without obvious abnormality, atraumatic. Eyes:  Conjunctivae/corneas clear, sclera anicteric. Neck: Supple, symmetrical, trachea midline. Lungs:   Clear to auscultation bilaterally. Heart:  Regular rate and rhythm, S1, S2 normal, no murmur, click, rub or gallop. Abdomen: Soft, non-tender. Bowel sounds normal. No masses,  No organomegaly. Extremities: Extremities normal, atraumatic, no cyanosis. 2+ pitting edema bilaterally to the thighs. Capillary refill normal.   Pulses: 2+ and symmetric all extremities. Skin: Skin color pink, turgor decreased. No rashes or lesions   Neurologic: No focal motor or sensory deficit. Labs Reviewed: All lab results for the last 24 hours reviewed.   Recent Results (from the past 24 hour(s))   EKG, 12 LEAD, INITIAL    Collection Time: 09/08/22  4:28 PM   Result Value Ref Range    Ventricular Rate 111 BPM    Atrial Rate 111 BPM    P-R Interval 122 ms    QRS Duration 68 ms    Q-T Interval 318 ms    QTC Calculation (Bezet) 432 ms    Calculated P Axis 49 degrees    Calculated R Axis 43 degrees    Calculated T Axis 49 degrees    Diagnosis       Poor data quality, interpretation may be adversely affected  Sinus tachycardia  Poor R Wave Progression  Abnormal ECG  Confirmed by Elyssa Hinkle MD, [facility] ([id number]) on 9/8/2022 10:08:22 PM     CBC WITH AUTOMATED DIFF    Collection Time: 09/08/22  4:30 PM   Result Value Ref Range    WBC 21.3 (H) 4.6 - 13.2 K/uL    RBC 3.18 (L) 4.20 - 5.30 M/uL    HGB 10.6 (L) 12.0 - 16.0 g/dL    HCT 30.4 (L) 35.0 - 45.0 %    MCV 95.6 78.0 - 100.0 FL    MCH 33.3 24.0 - 34.0 PG    MCHC 34.9 31.0 - 37.0 g/dL    RDW 17.8 (H) 11.6 - 14.5 %    PLATELET 071 431 - 470 K/uL    MPV 11.7 9.2 - 11.8 FL    NRBC 0.0 0  WBC    ABSOLUTE NRBC 0.00 0.00 - 0.01 K/uL    NEUTROPHILS 81 (H) 40 - 73 %    BAND NEUTROPHILS 2 0 - 5 %    LYMPHOCYTES 8 (L) 21 - 52 %    MONOCYTES 5 3 - 10 %    EOSINOPHILS 4 0 - 5 % BASOPHILS 0 0 - 2 %    IMMATURE GRANULOCYTES 0 %    ABS. NEUTROPHILS 17.6 (H) 1.8 - 8.0 K/UL    ABS. LYMPHOCYTES 1.7 0.9 - 3.6 K/UL    ABS. MONOCYTES 1.1 0.05 - 1.2 K/UL    ABS. EOSINOPHILS 0.9 (H) 0.0 - 0.4 K/UL    ABS. BASOPHILS 0.0 0.0 - 0.1 K/UL    ABS. IMM. GRANS. 0.0 K/UL    DF MANUAL      PLATELET COMMENTS LARGE PLATELETS      RBC COMMENTS ANISOCYTOSIS  1+        RBC COMMENTS TARGET CELLS  1+        WBC COMMENTS VACUOLATED POLYS     METABOLIC PANEL, COMPREHENSIVE    Collection Time: 09/08/22  4:30 PM   Result Value Ref Range    Sodium 139 136 - 145 mmol/L    Potassium 3.7 3.5 - 5.5 mmol/L    Chloride 107 100 - 111 mmol/L    CO2 25 21 - 32 mmol/L    Anion gap 7 3.0 - 18 mmol/L    Glucose 97 74 - 99 mg/dL    BUN 6 (L) 7.0 - 18 MG/DL    Creatinine 0.79 0.6 - 1.3 MG/DL    BUN/Creatinine ratio 8 (L) 12 - 20      GFR est AA >60 >60 ml/min/1.73m2    GFR est non-AA >60 >60 ml/min/1.73m2    Calcium 8.3 (L) 8.5 - 10.1 MG/DL    Bilirubin, total 3.6 (H) 0.2 - 1.0 MG/DL    ALT (SGPT) 14 13 - 56 U/L    AST (SGOT) 37 10 - 38 U/L    Alk.  phosphatase 265 (H) 45 - 117 U/L    Protein, total 8.5 (H) 6.4 - 8.2 g/dL    Albumin 1.8 (L) 3.4 - 5.0 g/dL    Globulin 6.7 (H) 2.0 - 4.0 g/dL    A-G Ratio 0.3 (L) 0.8 - 1.7     NT-PRO BNP    Collection Time: 09/08/22  4:30 PM   Result Value Ref Range    NT pro-BNP 1,658 (H) 0 - 900 PG/ML   TROPONIN-HIGH SENSITIVITY    Collection Time: 09/08/22  4:30 PM   Result Value Ref Range    Troponin-High Sensitivity 72 (H) 0 - 54 ng/L   D DIMER    Collection Time: 09/08/22  4:30 PM   Result Value Ref Range    D DIMER 6.82 (H) <0.46 ug/ml(FEU)   URINALYSIS W/ RFLX MICROSCOPIC    Collection Time: 09/08/22  5:55 PM   Result Value Ref Range    Color DARK YELLOW      Appearance CLEAR      Specific gravity 1.015 1.005 - 1.030      pH (UA) 6.5 5.0 - 8.0      Protein Negative NEG mg/dL    Glucose Negative NEG mg/dL    Ketone Negative NEG mg/dL    Bilirubin SMALL (A) NEG      Blood Negative NEG Urobilinogen 4.0 (H) 0.2 - 1.0 EU/dL    Nitrites Negative NEG      Leukocyte Esterase SMALL (A) NEG     URINE MICROSCOPIC ONLY    Collection Time: 09/08/22  5:55 PM   Result Value Ref Range    WBC 1 to 3 0 - 5 /hpf    RBC Negative 0 - 5 /hpf    Epithelial cells FEW 0 - 5 /lpf    Bacteria FEW (A) NEG /hpf   TROPONIN-HIGH SENSITIVITY    Collection Time: 09/08/22  7:07 PM   Result Value Ref Range    Troponin-High Sensitivity 64 (H) 0 - 54 ng/L   EKG, 12 LEAD, SUBSEQUENT    Collection Time: 09/08/22  7:15 PM   Result Value Ref Range    Ventricular Rate 99 BPM    Atrial Rate 99 BPM    P-R Interval 108 ms    QRS Duration 70 ms    Q-T Interval 344 ms    QTC Calculation (Bezet) 441 ms    Calculated P Axis 64 degrees    Calculated R Axis 35 degrees    Calculated T Axis 47 degrees    Diagnosis       Sinus rhythm with short PA  Otherwise normal ECG  Confirmed by Jovanny Sibley MD, [facility] ([id number]) on 9/8/2022 10:07:10 PM     LACTIC ACID    Collection Time: 09/08/22  8:15 PM   Result Value Ref Range    Lactic acid 1.0 0.4 - 2.0 MMOL/L     Results  CTA CHEST W OR W WO CONT (Accession [id number]) (Order [id number])  Allergies       No Known Allergies     Exam Information    Status Exam Begun  Exam Ended    Final [99] 9/08/2022 17:40 9/08/2022  6:36 PM [id number]  6:36 PM     Result Information    Status: Final result (Exam End: 9/8/2022 18:36) Provider Status: Open       CTA CHEST W OR W WO CONT: Patient Communication     Released  Not seen     Study Result    Narrative & Impression   EXAM: CTA CHEST W OR W WO CONT     CLINICAL INDICATION/HISTORY: BLE swelling, intermittent CP and SOB, elevated d  dimer  -Additional: None     COMPARISON: None     TECHNIQUE: Axial CT imaging from the thoracic inlet through the diaphragm with  intravenous contrast. Coronal and sagittal MIP reformats were generated.   One or more dose reduction techniques were used on this CT: automated exposure  control, adjustment of the mAs and/or kVp according to patient size, and  iterative reconstruction techniques. The specific techniques used on this CT  exam have been documented in the patient's electronic medical record. Digital  Imaging and Communications in Medicine (DICOM) format image data are available  to nonaffiliated external healthcare facilities or entities on a secure, media  free, reciprocally searchable basis with patient authorization for at least a  12-month period after this study. _______________     FINDINGS:     EXAM QUALITY: Adequate bolus timing with mild respiratory motion artifact  degradation. PULMONARY ARTERIES: No central, interlobar or visualized segmental branch  pulmonary arterial embolus. Normal caliber main pulmonary artery. MEDIASTINUM: Cardiomegaly without pericardial effusion. Trivial atherosclerotic  calcifications of the transverse aortic arch. LUNGS: Multifocal bilateral lower lobe and right middle lobe bandlike and linear  atelectasis. No consolidation. Lisseth  PLEURA: No pneumothorax or effusion. AIRWAY: Normal.     LYMPH NODES: Nonspecific Right hilar 1.6 cm enlarged lymph node. UPPER ABDOMEN: Small volume upper abdominal ascites and mesenteric fluid. Prior  cholecystectomy. Diffusely heterogeneous enhancing appearance of the liver which  is likely secondary to bolus timing artifact. .     OTHER: No acute or aggressive osseous abnormalities identified. _______________     IMPRESSION     1. No evidence of pulmonary embolism. 2.  Cardiomegaly with Multifocal bilateral lower lobe and right middle lobe  bandlike and linear atelectasis. Asymmetric enlarged right hilar lymph node,  potentially reactive. 3. Nonspecific small volume of abdominal ascites.      Results  XR CHEST PORT (Accession [id number]) (Order [id number])  Allergies       No Known Allergies     Exam Information    Status Exam Begun  Exam Ended    Final [99] 9/08/2022 16:45 9/08/2022  4:55 PM [id number]  4:55 PM     Result Information    Status: Final result (Exam End: 2022 16:55) Provider Status: Open       XR CHEST PORT: Patient Communication     Released  Not seen     Study Result    Narrative & Impression   EXAM: XR CHEST PORT     CLINICAL INDICATION/HISTORY: CP, SOB, BLE edema  -Additional: None     COMPARISON: None     TECHNIQUE: Frontal view of the chest     _______________     FINDINGS:     HEART AND MEDIASTINUM: Midline cardiac silhouette, normal in size. Unremarkable  hilar vascular structures. LUNGS AND PLEURAL SPACES: Bibasilar discoid interstitial opacities with mild  diffuse interstitial prominence. No pneumothorax or effusion. BONY THORAX AND SOFT TISSUES: No acute or destructive osseous abnormality. _______________     IMPRESSION     1. Bibasilar atelectasis and/or scarring, with nonspecific interstitial  prominence. Differential considerations may include early interstitial edema  versus chronic versus reactive small airways process. [place]  DUPLEX LOWER EXT VENOUS BILAT  Order# [id number]  Reading physician: Florecita Coates MD Ordering physician: YANIQUE Dsouza Study date: 22     Patient Information    Patient Name   Tone Park MRN   [id number] Legal Sex   Female      1955 (74 y.o.)     Vascular History    DUPLEX LOWER EXT VENOUS BILAT (Order #[id number]) on 22     Reason for Exam  Priority: STAT  Swelling extremity     Procedure Technique    Duplex images were obtained using 2-D gray scale, color flow, and spectral Doppler analysis. Vitals    Weight Height BSA (calculated - sq m) BP Pulse (Heart Rate)   56.2 kg (124 lb) 5' 1\" (1.549 m) 1.56 sq meters 112/60      Interpretation Summary    No evidence of deep vein thrombosis in the right lower extremity. No evidence of deep vein thrombosis in the left lower extremity. Lower Extremity Venous Findings      Right Lower Venous    Technically difficult exam due to: marked edema and inability to withstand probe pressure.   No evidence of deep vein thrombosis in the right lower extremity. The common femoral, profunda femoral, femoral, popliteal, posterior tibial, peroneal and saphenous femoral junction vein(s) were imaged in the transverse and longitudinal planes. The vessels showed normal color filling and compressibility. Doppler interrogation of the veins showed phasic and spontaneous flow. Left Lower Venous    Technically difficult exam due to: marked edema and inability to withstand probe pressure. No evidence of deep vein thrombosis in the left lower extremity. The common femoral, saphenous femoral junction, profunda femoral, femoral, popliteal, posterior tibial and peroneal vein(s) were imaged in the transverse and longitudinal planes. The vessels showed normal color filling and compressibility. Doppler interrogation of the veins showed phasic and spontaneous flow. Results  CT ABD PELV WO CONT (Accession [id number]) (Order [id number])  Allergies       No Known Allergies     Exam Information    Status Exam Begun  Exam Ended    Final [99] 9/08/2022 20:48 9/08/2022  9:13 PM [id number]  9:13 PM     Result Information    Status: Final result (Exam End: 9/8/2022 21:13) Provider Status: Open       CT ABD PELV WO CONT: Patient Communication     Released  Not seen     Study Result    Narrative & Impression   EXAM: CT ABD PELV WO CONT     CLINICAL INDICATION/HISTORY: leukocytosis and elevated bilirubin, ascites seen  on CTA chest  -Additional: None     COMPARISON: CTA chest from same day     TECHNIQUE: Axial CT imaging of the abdomen and pelvis was performed without  intravenous contrast. Multiplanar reformats were generated. One or more dose  reduction techniques were used on this CT: automated exposure control,  adjustment of the mAs and/or kVp according to patient size, and iterative  reconstruction techniques.   The specific techniques used on this CT exam have  been documented in the patient's electronic medical record. Digital Imaging and  Communications in Medicine (DICOM) format image data are available to  nonaffiliated external healthcare facilities or entities on a secure, media  free, reciprocally searchable basis with patient authorization for at least a  12-month period after this study. _______________     FINDINGS:     LOWER CHEST: Multifocal bibasilar bandlike and linear atelectasis. LIVER, BILIARY: Mild hepatomegaly with diffuse heterogeneous attenuation. No  biliary dilation. Prior cholecystectomy. PANCREAS: Unremarkable     SPLEEN: Splenomegaly measuring 14.6 cm in maximal dimension. ADRENALS: Normal.     KIDNEYS, URETERS, BLADDER: Symmetric excretion of intravenous contrast from  preceding same day CT chest examination. No findings of hydronephrosis. Unremarkable bladder     PELVIC ORGANS: Unremarkable. GASTROINTESTINAL TRACT: Degraded evaluation secondary absence of contrast. No  bowel dilation or wall thickening. Colonic diverticulosis, no CT evidence of  acute diverticulitis. LYMPH NODES: No enlarged lymph nodes. VASCULATURE: Atherosclerotic calcification without aneurysm. OSSEOUS: No acute or aggressive osseous abnormalities identified. OTHER: Small volume abdominal and pelvic ascites with mild diffuse soft tissue  anasarca. .     _______________     IMPRESSION     1. Hepatosplenomegaly with findings suggestive of steatosis. Nonspecific small  volume abdominal pelvic ascites and soft tissue anasarca. Diagnostic  considerations may represent sequela of chronic liver disease,  anemia/hypoproteinemia.

## 2022-09-10 LAB
ALBUMIN SERPL-MCNC: 1.8 G/DL (ref 3.4–5)
ALBUMIN/GLOB SERPL: 0.4 {RATIO} (ref 0.8–1.7)
ALP SERPL-CCNC: 210 U/L (ref 45–117)
ALT SERPL-CCNC: 10 U/L (ref 13–56)
ANION GAP SERPL CALC-SCNC: 4 MMOL/L (ref 3–18)
AST SERPL-CCNC: 38 U/L (ref 10–38)
BILIRUB SERPL-MCNC: 2.6 MG/DL (ref 0.2–1)
BUN SERPL-MCNC: 6 MG/DL (ref 7–18)
BUN/CREAT SERPL: 9 (ref 12–20)
CALCIUM SERPL-MCNC: 7.9 MG/DL (ref 8.5–10.1)
CHLORIDE SERPL-SCNC: 109 MMOL/L (ref 100–111)
CO2 SERPL-SCNC: 27 MMOL/L (ref 21–32)
CREAT SERPL-MCNC: 0.66 MG/DL (ref 0.6–1.3)
ERYTHROCYTE [DISTWIDTH] IN BLOOD BY AUTOMATED COUNT: 17.7 % (ref 11.6–14.5)
GLOBULIN SER CALC-MCNC: 5.1 G/DL (ref 2–4)
GLUCOSE SERPL-MCNC: 84 MG/DL (ref 74–99)
HCT VFR BLD AUTO: 25.4 % (ref 35–45)
HGB BLD-MCNC: 8.8 G/DL (ref 12–16)
MCH RBC QN AUTO: 32.8 PG (ref 24–34)
MCHC RBC AUTO-ENTMCNC: 34.6 G/DL (ref 31–37)
MCV RBC AUTO: 94.8 FL (ref 78–100)
NRBC # BLD: 0 K/UL (ref 0–0.01)
NRBC BLD-RTO: 0 PER 100 WBC
PLATELET # BLD AUTO: 148 K/UL (ref 135–420)
PMV BLD AUTO: 12 FL (ref 9.2–11.8)
POTASSIUM SERPL-SCNC: 3.2 MMOL/L (ref 3.5–5.5)
PROT SERPL-MCNC: 6.9 G/DL (ref 6.4–8.2)
RBC # BLD AUTO: 2.68 M/UL (ref 4.2–5.3)
SODIUM SERPL-SCNC: 140 MMOL/L (ref 136–145)
WBC # BLD AUTO: 13 K/UL (ref 4.6–13.2)

## 2022-09-10 PROCEDURE — 94640 AIRWAY INHALATION TREATMENT: CPT

## 2022-09-10 PROCEDURE — 80053 COMPREHEN METABOLIC PANEL: CPT

## 2022-09-10 PROCEDURE — 36415 COLL VENOUS BLD VENIPUNCTURE: CPT

## 2022-09-10 PROCEDURE — 85027 COMPLETE CBC AUTOMATED: CPT

## 2022-09-10 PROCEDURE — 74011250636 HC RX REV CODE- 250/636: Performed by: FAMILY MEDICINE

## 2022-09-10 PROCEDURE — P9047 ALBUMIN (HUMAN), 25%, 50ML: HCPCS | Performed by: FAMILY MEDICINE

## 2022-09-10 PROCEDURE — 74011000250 HC RX REV CODE- 250: Performed by: FAMILY MEDICINE

## 2022-09-10 PROCEDURE — 74011250637 HC RX REV CODE- 250/637: Performed by: FAMILY MEDICINE

## 2022-09-10 PROCEDURE — 65270000046 HC RM TELEMETRY

## 2022-09-10 RX ADMIN — CARVEDILOL 3.12 MG: 3.12 TABLET, FILM COATED ORAL at 09:20

## 2022-09-10 RX ADMIN — SERTRALINE HYDROCHLORIDE 25 MG: 50 TABLET ORAL at 09:20

## 2022-09-10 RX ADMIN — SODIUM CHLORIDE, PRESERVATIVE FREE 10 ML: 5 INJECTION INTRAVENOUS at 06:11

## 2022-09-10 RX ADMIN — ACETAMINOPHEN 650 MG: 325 TABLET ORAL at 22:03

## 2022-09-10 RX ADMIN — PANTOPRAZOLE SODIUM 40 MG: 40 TABLET, DELAYED RELEASE ORAL at 20:40

## 2022-09-10 RX ADMIN — PANTOPRAZOLE SODIUM 40 MG: 40 TABLET, DELAYED RELEASE ORAL at 09:20

## 2022-09-10 RX ADMIN — SODIUM CHLORIDE, PRESERVATIVE FREE 10 ML: 5 INJECTION INTRAVENOUS at 15:10

## 2022-09-10 RX ADMIN — IPRATROPIUM BROMIDE 0.5 MG: 0.5 SOLUTION RESPIRATORY (INHALATION) at 07:48

## 2022-09-10 RX ADMIN — IPRATROPIUM BROMIDE 0.5 MG: 0.5 SOLUTION RESPIRATORY (INHALATION) at 20:25

## 2022-09-10 RX ADMIN — ALBUMIN (HUMAN) 25 G: 0.25 INJECTION, SOLUTION INTRAVENOUS at 01:34

## 2022-09-10 RX ADMIN — ACETAMINOPHEN 650 MG: 325 TABLET ORAL at 06:26

## 2022-09-10 RX ADMIN — ALBUMIN (HUMAN) 25 G: 0.25 INJECTION, SOLUTION INTRAVENOUS at 15:10

## 2022-09-10 RX ADMIN — ALBUMIN (HUMAN) 25 G: 0.25 INJECTION, SOLUTION INTRAVENOUS at 18:33

## 2022-09-10 RX ADMIN — ALBUMIN (HUMAN) 25 G: 0.25 INJECTION, SOLUTION INTRAVENOUS at 06:10

## 2022-09-10 RX ADMIN — ASPIRIN 81 MG: 81 TABLET, CHEWABLE ORAL at 09:20

## 2022-09-10 RX ADMIN — FUROSEMIDE 20 MG: 10 INJECTION, SOLUTION INTRAMUSCULAR; INTRAVENOUS at 09:19

## 2022-09-10 RX ADMIN — ENOXAPARIN SODIUM 40 MG: 100 INJECTION SUBCUTANEOUS at 09:20

## 2022-09-10 RX ADMIN — CARVEDILOL 3.12 MG: 3.12 TABLET, FILM COATED ORAL at 18:33

## 2022-09-10 NOTE — PROGRESS NOTES
Problem: Falls - Risk of  Goal: *Absence of Falls  Description: Document Lincoln Dolin Fall Risk and appropriate interventions in the flowsheet.   Outcome: Progressing Towards Goal  Note: Fall Risk Interventions:  Mobility Interventions: Patient to call before getting OOB    Mentation Interventions: Adequate sleep, hydration, pain control

## 2022-09-10 NOTE — CONSULTS
[facility] Consult Note      Patient: Laina Martinez MRN: [id number]  SSN: [id number]    YOB: 1955  Age: 74 y.o. Sex: female    Date of Consultation: 09/09/2022  Referring Physician: Amandeep Marte   Reason for Consultation: CHF    Chief complain:   Shortness of breath, leg swelling    HPI:  30-year-old female came to emergency with complaining of shortness of breath, leg swelling, abdominal distention. Currently patient feels fatigue and tired. She is not good historian. Cardiology consult called for evaluation of CHF. History obtained from patient's chart.     Past Medical History:   Diagnosis Date    Alcohol use 9/8/2022    CAD (coronary artery disease) 9/8/2022    Chronic obstructive pulmonary disease (HCC)     COPD (chronic obstructive pulmonary disease) ([facility] [address] 9/8/2022    Heart failure ([facility] [address]     Hypertension     Memory impairment 9/8/2022    Smoker 9/8/2022     Past Surgical History:   Procedure Laterality Date    HX GYN       Current Facility-Administered Medications   Medication Dose Route Frequency    pantoprazole (PROTONIX) tablet 40 mg  40 mg Oral BID    sertraline (ZOLOFT) tablet 25 mg  25 mg Oral DAILY    carvediloL (COREG) tablet 3.125 mg  3.125 mg Oral BID WITH MEALS    aspirin chewable tablet 81 mg  81 mg Oral DAILY    furosemide (LASIX) injection 20 mg  20 mg IntraVENous BID    potassium bicarb-citric acid (EFFER-K) tablet 40 mEq  40 mEq Oral BID    albumin human 25% (BUMINATE) solution 25 g  25 g IntraVENous Q6H    ipratropium (ATROVENT) 0.02 % nebulizer solution 0.5 mg  0.5 mg Nebulization BID RT    acetaminophen (TYLENOL) tablet 650 mg  650 mg Oral Q6H PRN    Or    acetaminophen (TYLENOL) suppository 650 mg  650 mg Rectal Q6H PRN    polyethylene glycol (MIRALAX) packet 17 g  17 g Oral DAILY PRN    ondansetron (ZOFRAN ODT) tablet 4 mg  4 mg Oral Q8H PRN    Or    ondansetron (ZOFRAN) injection 4 mg  4 mg IntraVENous Q6H PRN    enoxaparin (LOVENOX) injection 40 mg  40 mg SubCUTAneous DAILY sodium chloride (NS) flush 5-40 mL  5-40 mL IntraVENous Q8H    sodium chloride (NS) flush 5-40 mL  5-40 mL IntraVENous PRN    LORazepam (ATIVAN) tablet 1 mg  1 mg Oral Q1H PRN    Or    LORazepam (ATIVAN) 2 mg/mL injection 1 mg  1 mg IntraVENous Q1H PRN    LORazepam (ATIVAN) tablet 2 mg  2 mg Oral Q1H PRN    Or    LORazepam (ATIVAN) 2 mg/mL injection 2 mg  2 mg IntraVENous Q1H PRN    LORazepam (ATIVAN) 2 mg/mL injection 3 mg  3 mg IntraVENous Q15MIN PRN       Allergies and Intolerances:   No Known Allergies    Family History:   Family History   Problem Relation Age of Onset    Hypertension Mother     Elevated Lipids Mother     Heart Disease Mother     Diabetes Mother     Hypertension Father     Heart Disease Father     Elevated Lipids Father     Kidney Disease Father     Seizures Daughter        Social History:   She  reports that she has been smoking cigarettes. She has been smoking an average of .5 packs per day. She has never used smokeless tobacco.  She  reports current alcohol use of about 7.0 standard drinks per week. Review of Systems:     Gen: No fever, chills, malaise, weight loss/gain. Heent: No headache, rhinorrhea, epistaxis, ear pain, hearing loss, sinus pain, neck pain/stiffness, sore throat. Heart:   Positive shortness of breath, No chest pain, palpitations,  pnd, or orthopnea. Resp: No cough, hemoptysis, wheezing and  dyspnea  GI: No nausea, vomiting, diarrhea, constipation, melena or hematochezia. : No urinary obstruction, dysuria or hematuria. Derm: No rash, new skin lesion or pruritis. Musc/skeletal:  positive bone or joint complains. Vasc:  positive edema, no cyanosis or claudication. Endo: No heat/cold intolerance, no polyuria,polydipsia or polyphagia. Neuro: No unilateral weakness, numbness, tingling. No seizures. Heme: No easy bruising or bleeding.       Physical:   Patient Vitals for the past 6 hrs:   Temp Pulse Resp BP SpO2   09/09/22 1945 98.9 °F (37.2 °C) 94 16 (!) 114/48 100 %         Exam:   General Appearance: Comfortable, not using accessory muscles of respiration, looks chronically ill. HEENT: MEGAN. HEAD: Atraumatic  NECK: No JVD, no thyroidomeglay. CAROTIDS: no bruit  LUNGS: Clear bilaterally. HEART: S1+S2 audible, no murmur, no pericardial rub. ABD: Non-tender, BS Audible    EXT:  bilateral lower extremity + edema, and no cysnosis. VASCULAR EXAM: Pulses are intact. PSYCHIATRIC EXAM: Mood is appropriate. MUSCULOSKELETAL: Grossly no joint deformity.   NEUROLOGICAL: Lethargic, follows simple verbal commands    Review of Data:   LABS:   Lab Results   Component Value Date/Time    WBC 15.9 (H) 09/09/2022 01:40 AM    HGB 8.9 (L) 09/09/2022 01:40 AM    HCT 25.7 (L) 09/09/2022 01:40 AM    PLATELET 954 93/60/4170 01:40 AM     Lab Results   Component Value Date/Time    Sodium 139 09/09/2022 01:40 AM    Potassium 3.5 09/09/2022 01:40 AM    Chloride 107 09/09/2022 01:40 AM    CO2 26 09/09/2022 01:40 AM    Glucose 94 09/09/2022 01:40 AM    BUN 6 (L) 09/09/2022 01:40 AM    Creatinine 0.86 09/09/2022 01:40 AM     Lab Results   Component Value Date/Time    Cholesterol, total 88 09/09/2022 01:40 AM    HDL Cholesterol 21 (L) 09/09/2022 01:40 AM    LDL, calculated 49.6 09/09/2022 01:40 AM    Triglyceride 87 09/09/2022 01:40 AM     No components found for: GPT  No results found for: HBA1C, BTH4JRUW, VGO8GCDB      Cardiology Procedures:   Results for orders placed or performed during the hospital encounter of 09/08/22   EKG, 12 LEAD, INITIAL   Result Value Ref Range    Ventricular Rate 111 BPM    Atrial Rate 111 BPM    P-R Interval 122 ms    QRS Duration 68 ms    Q-T Interval 318 ms    QTC Calculation (Bezet) 432 ms    Calculated P Axis 49 degrees    Calculated R Axis 43 degrees    Calculated T Axis 49 degrees    Diagnosis       Poor data quality, interpretation may be adversely affected  Sinus tachycardia  Poor R Wave Progression  Abnormal ECG  Confirmed by Jared Cooper MD, Jane Hopkins ([id number]) on 9/8/2022 10:08:22 PM             Impression / Plan:    Patient Active Problem List   Diagnosis Code    CAD (coronary artery disease) I25.10    Memory impairment R41.3    COPD (chronic obstructive pulmonary disease) ([facility]) J44.9    Acute exacerbation of CHF (congestive heart failure) ([facility]) I50.9    Leukocytosis D72.829    Alcohol use Z72.89    Smoker F17.200    Poor historian Z78.9    Hepatic steatosis K76.0    Noncompliance with medications Z91.14    Elevated troponin R77.8    Hypoalbuminemia due to protein-calorie malnutrition ([facility]) E88.09, E46    Anemia D64.9     Abnormal troponin:  no clear evidence of acute coronary syndrome could be demand ischemia     Echocardiogram revealed      Technical qualifiers: Echo study was limited due to patient tolerance. Left Ventricle: Normal left ventricular systolic function with a visually estimated EF of 55 - 60%. Left ventricle is mildly dilated. Normal wall thickness. Normal wall motion. Grade I diastolic dysfunction present. Pulmonary Arteries: Mild pulmonary hypertension present. The estimated PASP is 44 mmHg. Plan:    Continue aspirin and carvedilol. Continue IV Lasix. Strict input output. Monitor renal function and electrolytes.     Patient will be seen by Dr. Jena Tello on weekend            Signed By: Hilario Sanchez MD     September 9, 2022

## 2022-09-10 NOTE — PROGRESS NOTES
DC Plan: home with family assistance     Care manager met with patient at bedside as follow up assessment for needs. Patient appears slightly suspicious of care manger questions but did participate. States lives with daughter, is independent at home and when asked about expectation of hospital stay, stated simply, \"I am ready to go home. \"  Will follow for needs. Care Management Interventions  PCP Verified by CM:  Yes  Transition of Care Consult (CM Consult): Discharge Planning  Support Systems: Child(reji)  Confirm Follow Up Transport: Family  The Plan for Transition of Care is Related to the Following Treatment Goals : home with [facility] versus home with family assistance  Discharge Location  Patient Expects to be Discharged to[de-identified] Home with home health

## 2022-09-10 NOTE — PROGRESS NOTES
Leukocytosis resolved  Anemia stable, work up so far negative for iron B12 deficiency and hemolysis   For hyperglobulinemia, SPEP is pending. We will follow. Yazan Arrington MD, PhD, [facility] AND [place]

## 2022-09-10 NOTE — PROGRESS NOTES
Hospitalist Progress Note    Patient: Danica Vora MRN: [id number]  CSN: [id number]    YOB: 1955  Age: 74 y.o. Sex: female    DOA: 9/8/2022 LOS:  LOS: 2 days          Chief Complaint:    acute CHF exacerbation    Assessment/Plan     74-year-old female with CAD, COPD, CHF, chronic leukocytosis, chronic alcohol use, memory impairment, and tobacco use is admitted with acute CHF exacerbation with bilateral lower extremity swelling, hypoalbuminemia due to protein-calorie malnutrition, and hepatic steatosis.      Acute CHF exacerbation with elevated troponin  Chronic leukocytosis with hepatosplenomegaly  Anemia   Elevated d-dimer   Hypoalbuminemia due to protein calorie malnutrition  Hepatic steatosis-suspect possible cirrhosis as well  Noncompliance with medications  Chronic longstanding alcohol use  COPD without exacerbation  Smoker  CAD  Cognitive impairment-chronic, lives with her daughter    Appreciate hematology following -anemia stable, work up so far negative for iron B12 deficiency and hemolysis, leukocytosis resolved, possibility of hyperglobulinemia is still under consideration and SPEP is pending    Continue to manage CHF exacerbation with fluid restriction, IV lasix, ASA and coreg     Replete K     Albumin 1.8    Palliative care consult given overall poor prognosis     Prophylaxis: protonix PO, lovenox SQ    Disposition :  Patient Active Problem List   Diagnosis Code    CAD (coronary artery disease) I25.10    Memory impairment R41.3    COPD (chronic obstructive pulmonary disease) ([facility] [address] J44.9    Acute exacerbation of CHF (congestive heart failure) ([facility]) I50.9    Leukocytosis D72.829    Alcohol use Z72.89    Smoker F17.200    Poor historian Z78.9    Hepatic steatosis K76.0    Noncompliance with medications Z91.14    Elevated troponin R77.8    Hypoalbuminemia due to protein-calorie malnutrition ([facility] [address] E88.09, E46    Anemia D64.9       Subjective:    No complaints this AM     Review of systems:    Constitutional: denies fevers, chills, myalgias  Respiratory: denies SOB, cough  Cardiovascular: denies chest pain, palpitations  Gastrointestinal: denies nausea, vomiting, diarrhea      Vital signs/Intake and Output:  Visit Vitals  BP (!) 111/55   Pulse 83   Temp 98.8 °F (37.1 °C)   Resp 16   Ht 5' 1\" (1.549 m)   Wt 56.2 kg (124 lb)   SpO2 99%   BMI 23.43 kg/m²     Current Shift:  No intake/output data recorded. Last three shifts:  No intake/output data recorded. Exam:    General: Well developed, alert, NAD, OX3  Head/Neck: NCAT, supple, No masses, No lymphadenopathy  CVS:Regular rate and rhythm, no M/R/G, S1/S2 heard, no thrill  Lungs:Clear to auscultation bilaterally, no wheezes, rhonchi, or rales  Abdomen: Soft, Nontender, No distention, Normal Bowel sounds, No hepatomegaly  Extremities: No C/C/E, pulses palpable 2+  Skin:normal texture and turgor, no rashes, no lesions  Neuro:grossly normal , follows commands  Psych:appropriate                Labs: Results:       Chemistry Recent Labs     09/10/22  0257 09/09/22  0140 09/08/22  1630   GLU 84 94 97    139 139   K 3.2* 3.5 3.7    107 107   CO2 27 26 25   BUN 6* 6* 6*   CREA 0.66 0.86 0.79   CA 7.9* 7.8* 8.3*   AGAP 4 6 7   BUCR 9* 7* 8*   * 210* 265*   TP 6.9 7.0 8.5*   ALB 1.8* 1.4* 1.8*   GLOB 5.1* 5.6* 6.7*   AGRAT 0.4* 0.3* 0.3*        CBC w/Diff Recent Labs     09/10/22  0257 09/09/22  0140 09/08/22  1630   WBC 13.0 15.9* 21.3*   RBC 2.68* 2.69* 3.18*   HGB 8.8* 8.9* 10.6*   HCT 25.4* 25.7* 30.4*    158 198   GRANS  --   --  81*   LYMPH  --   --  8*   EOS  --   --  4        Cardiac Enzymes No results for input(s): CPK, CKND1, AURELIA in the last 72 hours. No lab exists for component: CKRMB, TROIP   Coagulation No results for input(s): PTP, INR, APTT, INREXT, INREXT in the last 72 hours.     Lipid Panel Lab Results   Component Value Date/Time    Cholesterol, total 88 09/09/2022 01:40 AM    HDL Cholesterol 21 (L) 09/09/2022 01:40 AM    LDL, calculated 49.6 09/09/2022 01:40 AM    VLDL, calculated 17.4 09/09/2022 01:40 AM    Triglyceride 87 09/09/2022 01:40 AM    CHOL/HDL Ratio 4.2 09/09/2022 01:40 AM      BNP No results for input(s): BNPP in the last 72 hours.    Liver Enzymes Recent Labs     09/10/22  0257   TP 6.9   ALB 1.8*   *        Thyroid Studies No results found for: T4, T3U, TSH, TSHEXT, TSHEXT     Procedures/imaging: see electronic medical records for all procedures/Xrays and details which were not copied into this note but were reviewed prior to creation of Kranthi Bob MD

## 2022-09-10 NOTE — PROGRESS NOTES
Cardiology Progress Note        Patient: Caterina Cochran        Sex: female          DOA: 9/8/2022  YOB: 1955      Age:  74 y.o.        LOS:  LOS: 2 days   Assessment/Plan     Principal Problem:    Acute exacerbation of CHF (congestive heart failure) ([facility] [address] (9/8/2022)    Active Problems:    CAD (coronary artery disease) (9/8/2022)      Memory impairment (9/8/2022)      COPD (chronic obstructive pulmonary disease) ([facility] [address] (9/8/2022)      Leukocytosis (9/8/2022)      Alcohol use (9/8/2022)      Smoker (9/8/2022)      Poor historian (9/9/2022)      Hepatic steatosis (9/9/2022)      Noncompliance with medications (9/9/2022)      Elevated troponin (9/9/2022)      Hypoalbuminemia due to protein-calorie malnutrition ([facility] (9/9/2022)      Anemia (9/9/2022)        Plan:    Shortness of breath  Minimal troponin elevation without ACS        Technical qualifiers: Echo study was limited due to patient tolerance. Left Ventricle: Normal left ventricular systolic function with a visually estimated EF of 55 - 60%. Left ventricle is mildly dilated. Normal wall thickness. Normal wall motion. Grade I diastolic dysfunction present. Pulmonary Arteries: Mild pulmonary hypertension present. The estimated PASP is 44 mmHg. Continue with aspirin, Lasix and carvedilol              Subjective:    cc:  SOB        REVIEW OF SYSTEMS:     General: No fevers or chills. Cardiovascular: No chest pain or pressure. No palpitations. No ankle swelling  Pulmonary: No SOB, orthopnea, PND  Gastrointestinal: No nausea, vomiting or diarrhea      Objective:      Visit Vitals  BP (!) 97/41   Pulse 80   Temp 98.6 °F (37 °C)   Resp 16   Ht 5' 1\" (1.549 m)   Wt 56.2 kg (124 lb)   SpO2 97%   BMI 23.43 kg/m²     Body mass index is 23.43 kg/m². Physical Exam:  General Appearance: Comfortable, not using accessory muscles of respiration. NECK: No JVD, no thyroidomeglay. LUNGS: Clear bilaterally.    HEART: S1+S2 audible,    ABD: Non-tender, BS Audible    EXT: No edema, and no cysnosis. VASCULAR EXAM: Pulses are intact. PSYCHIATRIC EXAM: Mood is appropriate. Medication:  Current Facility-Administered Medications   Medication Dose Route Frequency    pantoprazole (PROTONIX) tablet 40 mg  40 mg Oral BID    sertraline (ZOLOFT) tablet 25 mg  25 mg Oral DAILY    carvediloL (COREG) tablet 3.125 mg  3.125 mg Oral BID WITH MEALS    aspirin chewable tablet 81 mg  81 mg Oral DAILY    furosemide (LASIX) injection 20 mg  20 mg IntraVENous BID    albumin human 25% (BUMINATE) solution 25 g  25 g IntraVENous Q6H    ipratropium (ATROVENT) 0.02 % nebulizer solution 0.5 mg  0.5 mg Nebulization BID RT    acetaminophen (TYLENOL) tablet 650 mg  650 mg Oral Q6H PRN    Or    acetaminophen (TYLENOL) suppository 650 mg  650 mg Rectal Q6H PRN    polyethylene glycol (MIRALAX) packet 17 g  17 g Oral DAILY PRN    ondansetron (ZOFRAN ODT) tablet 4 mg  4 mg Oral Q8H PRN    Or    ondansetron (ZOFRAN) injection 4 mg  4 mg IntraVENous Q6H PRN    enoxaparin (LOVENOX) injection 40 mg  40 mg SubCUTAneous DAILY    sodium chloride (NS) flush 5-40 mL  5-40 mL IntraVENous Q8H    sodium chloride (NS) flush 5-40 mL  5-40 mL IntraVENous PRN    LORazepam (ATIVAN) tablet 1 mg  1 mg Oral Q1H PRN    Or    LORazepam (ATIVAN) 2 mg/mL injection 1 mg  1 mg IntraVENous Q1H PRN    LORazepam (ATIVAN) tablet 2 mg  2 mg Oral Q1H PRN    Or    LORazepam (ATIVAN) 2 mg/mL injection 2 mg  2 mg IntraVENous Q1H PRN    LORazepam (ATIVAN) 2 mg/mL injection 3 mg  3 mg IntraVENous Q15MIN PRN               Lab/Data Reviewed:  Procedures/imaging: see electronic medical records for all procedures/Xrays   and details which were not copied into this note but were reviewed prior to creation of Plan       All lab results for the last 24 hours reviewed.      Recent Labs     09/10/22  0257 09/09/22  0140 09/08/22  1630   WBC 13.0 15.9* 21.3*   HGB 8.8* 8.9* 10.6*   HCT 25.4* 25.7* 30.4*   PLT 148 158 198     Recent Labs     09/10/22  0257 09/09/22  0140 09/08/22  1630    139 139   K 3.2* 3.5 3.7    107 107   CO2 27 26 25   GLU 84 94 97   BUN 6* 6* 6*   CREA 0.66 0.86 0.79   CA 7.9* 7.8* 8.3*       RADIOLOGY:  CT Results  (Last 48 hours)                 09/08/22 2113  CT ABD PELV WO CONT Final result    Impression:      1. Hepatosplenomegaly with findings suggestive of steatosis. Nonspecific small   volume abdominal pelvic ascites and soft tissue anasarca. Diagnostic   considerations may represent sequela of chronic liver disease,   anemia/hypoproteinemia. Narrative:  EXAM: CT ABD PELV WO CONT       CLINICAL INDICATION/HISTORY: leukocytosis and elevated bilirubin, ascites seen   on CTA chest   -Additional: None       COMPARISON: CTA chest from same day       TECHNIQUE: Axial CT imaging of the abdomen and pelvis was performed without   intravenous contrast. Multiplanar reformats were generated. One or more dose   reduction techniques were used on this CT: automated exposure control,   adjustment of the mAs and/or kVp according to patient size, and iterative   reconstruction techniques. The specific techniques used on this CT exam have   been documented in the patient's electronic medical record. Digital Imaging and   Communications in Medicine (DICOM) format image data are available to   nonaffiliated external healthcare facilities or entities on a secure, media   free, reciprocally searchable basis with patient authorization for at least a   12-month period after this study. _______________       FINDINGS:       LOWER CHEST: Multifocal bibasilar bandlike and linear atelectasis. LIVER, BILIARY: Mild hepatomegaly with diffuse heterogeneous attenuation. No   biliary dilation. Prior cholecystectomy. PANCREAS: Unremarkable       SPLEEN: Splenomegaly measuring 14.6 cm in maximal dimension.        ADRENALS: Normal.       KIDNEYS, URETERS, BLADDER: Symmetric excretion of intravenous contrast from   preceding same day CT chest examination. No findings of hydronephrosis. Unremarkable bladder       PELVIC ORGANS: Unremarkable. GASTROINTESTINAL TRACT: Degraded evaluation secondary absence of contrast. No   bowel dilation or wall thickening. Colonic diverticulosis, no CT evidence of   acute diverticulitis. LYMPH NODES: No enlarged lymph nodes. VASCULATURE: Atherosclerotic calcification without aneurysm. OSSEOUS: No acute or aggressive osseous abnormalities identified. OTHER: Small volume abdominal and pelvic ascites with mild diffuse soft tissue   anasarca. .       _______________           09/08/22 1836  CTA CHEST W OR W WO CONT Final result    Impression:      1. No evidence of pulmonary embolism. 2.  Cardiomegaly with Multifocal bilateral lower lobe and right middle lobe   bandlike and linear atelectasis. Asymmetric enlarged right hilar lymph node,   potentially reactive. 3. Nonspecific small volume of abdominal ascites. Narrative:  EXAM: CTA CHEST W OR W WO CONT       CLINICAL INDICATION/HISTORY: BLE swelling, intermittent CP and SOB, elevated d   dimer   -Additional: None       COMPARISON: None       TECHNIQUE: Axial CT imaging from the thoracic inlet through the diaphragm with   intravenous contrast. Coronal and sagittal MIP reformats were generated. One or more dose reduction techniques were used on this CT: automated exposure   control, adjustment of the mAs and/or kVp according to patient size, and   iterative reconstruction techniques. The specific techniques used on this CT   exam have been documented in the patient's electronic medical record. Digital   Imaging and Communications in Medicine (DICOM) format image data are available   to nonaffiliated external healthcare facilities or entities on a secure, media   free, reciprocally searchable basis with patient authorization for at least a   12-month period after this study. _______________       FINDINGS:       EXAM QUALITY: Adequate bolus timing with mild respiratory motion artifact   degradation. PULMONARY ARTERIES: No central, interlobar or visualized segmental branch   pulmonary arterial embolus. Normal caliber main pulmonary artery. MEDIASTINUM: Cardiomegaly without pericardial effusion. Trivial atherosclerotic   calcifications of the transverse aortic arch. LUNGS: Multifocal bilateral lower lobe and right middle lobe bandlike and linear   atelectasis. No consolidation. Bertha [place] PLEURA: No pneumothorax or effusion. AIRWAY: Normal.       LYMPH NODES: Nonspecific Right hilar 1.6 cm enlarged lymph node. UPPER ABDOMEN: Small volume upper abdominal ascites and mesenteric fluid. Prior   cholecystectomy. Diffusely heterogeneous enhancing appearance of the liver which   is likely secondary to bolus timing artifact. .       OTHER: No acute or aggressive osseous abnormalities identified. _______________                 CXR Results  (Last 48 hours)                 09/08/22 1655  XR CHEST PORT Final result    Impression:      1. Bibasilar atelectasis and/or scarring, with nonspecific interstitial   prominence. Differential considerations may include early interstitial edema   versus chronic versus reactive small airways process. Narrative:  EXAM: XR CHEST PORT       CLINICAL INDICATION/HISTORY: CP, SOB, BLE edema   -Additional: None       COMPARISON: None       TECHNIQUE: Frontal view of the chest       _______________       FINDINGS:       HEART AND MEDIASTINUM: Midline cardiac silhouette, normal in size. Unremarkable   hilar vascular structures. LUNGS AND PLEURAL SPACES: Bibasilar discoid interstitial opacities with mild   diffuse interstitial prominence. No pneumothorax or effusion. BONY THORAX AND SOFT TISSUES: No acute or destructive osseous abnormality.        _______________                     Cardiology Procedures: Results for orders placed or performed during the hospital encounter of 09/08/22   EKG, 12 LEAD, INITIAL   Result Value Ref Range    Ventricular Rate 111 BPM    Atrial Rate 111 BPM    P-R Interval 122 ms    QRS Duration 68 ms    Q-T Interval 318 ms    QTC Calculation (Bezet) 432 ms    Calculated P Axis 49 degrees    Calculated R Axis 43 degrees    Calculated T Axis 49 degrees    Diagnosis       Poor data quality, interpretation may be adversely affected  Sinus tachycardia  Poor R Wave Progression  Abnormal ECG  Confirmed by Nolvia Chilel MD, Doris Gillette ([id number]) on 9/8/2022 10:08:22 PM        Echo Results  (Last 48 hours)      None         Cardiolite (Tc-99m Sestamibi) stress test    Signed By: Vanessa Koo MD     September 10, 2022

## 2022-09-11 LAB
ALBUMIN SERPL-MCNC: 2.8 G/DL (ref 3.4–5)
ALBUMIN/GLOB SERPL: 0.7 {RATIO} (ref 0.8–1.7)
ALP SERPL-CCNC: 154 U/L (ref 45–117)
ALT SERPL-CCNC: 11 U/L (ref 13–56)
ANION GAP SERPL CALC-SCNC: 5 MMOL/L (ref 3–18)
AST SERPL-CCNC: 38 U/L (ref 10–38)
BILIRUB SERPL-MCNC: 3.9 MG/DL (ref 0.2–1)
BUN SERPL-MCNC: 5 MG/DL (ref 7–18)
BUN/CREAT SERPL: 8 (ref 12–20)
CALCIUM SERPL-MCNC: 8.2 MG/DL (ref 8.5–10.1)
CHLORIDE SERPL-SCNC: 107 MMOL/L (ref 100–111)
CO2 SERPL-SCNC: 26 MMOL/L (ref 21–32)
CREAT SERPL-MCNC: 0.6 MG/DL (ref 0.6–1.3)
ERYTHROCYTE [DISTWIDTH] IN BLOOD BY AUTOMATED COUNT: 17.5 % (ref 11.6–14.5)
GLOBULIN SER CALC-MCNC: 4.3 G/DL (ref 2–4)
GLUCOSE SERPL-MCNC: 72 MG/DL (ref 74–99)
HCT VFR BLD AUTO: 25.5 % (ref 35–45)
HGB BLD-MCNC: 8.8 G/DL (ref 12–16)
MCH RBC QN AUTO: 32.6 PG (ref 24–34)
MCHC RBC AUTO-ENTMCNC: 34.5 G/DL (ref 31–37)
MCV RBC AUTO: 94.4 FL (ref 78–100)
NRBC # BLD: 0 K/UL (ref 0–0.01)
NRBC BLD-RTO: 0 PER 100 WBC
PLATELET # BLD AUTO: 161 K/UL (ref 135–420)
PMV BLD AUTO: 11.7 FL (ref 9.2–11.8)
POTASSIUM SERPL-SCNC: 3 MMOL/L (ref 3.5–5.5)
PROT SERPL-MCNC: 7.1 G/DL (ref 6.4–8.2)
RBC # BLD AUTO: 2.7 M/UL (ref 4.2–5.3)
SODIUM SERPL-SCNC: 138 MMOL/L (ref 136–145)
WBC # BLD AUTO: 12.7 K/UL (ref 4.6–13.2)

## 2022-09-11 PROCEDURE — 94640 AIRWAY INHALATION TREATMENT: CPT

## 2022-09-11 PROCEDURE — 85027 COMPLETE CBC AUTOMATED: CPT

## 2022-09-11 PROCEDURE — 74011000250 HC RX REV CODE- 250: Performed by: FAMILY MEDICINE

## 2022-09-11 PROCEDURE — 74011250637 HC RX REV CODE- 250/637: Performed by: FAMILY MEDICINE

## 2022-09-11 PROCEDURE — 36415 COLL VENOUS BLD VENIPUNCTURE: CPT

## 2022-09-11 PROCEDURE — 74011250636 HC RX REV CODE- 250/636: Performed by: FAMILY MEDICINE

## 2022-09-11 PROCEDURE — 80053 COMPREHEN METABOLIC PANEL: CPT

## 2022-09-11 PROCEDURE — 94760 N-INVAS EAR/PLS OXIMETRY 1: CPT

## 2022-09-11 PROCEDURE — 65270000046 HC RM TELEMETRY

## 2022-09-11 PROCEDURE — P9047 ALBUMIN (HUMAN), 25%, 50ML: HCPCS | Performed by: FAMILY MEDICINE

## 2022-09-11 RX ORDER — POTASSIUM CHLORIDE 7.45 MG/ML
10 INJECTION INTRAVENOUS
Status: COMPLETED | OUTPATIENT
Start: 2022-09-11 | End: 2022-09-11

## 2022-09-11 RX ADMIN — PANTOPRAZOLE SODIUM 40 MG: 40 TABLET, DELAYED RELEASE ORAL at 10:21

## 2022-09-11 RX ADMIN — ALBUMIN (HUMAN) 25 G: 0.25 INJECTION, SOLUTION INTRAVENOUS at 00:55

## 2022-09-11 RX ADMIN — ACETAMINOPHEN 650 MG: 325 TABLET ORAL at 23:50

## 2022-09-11 RX ADMIN — POTASSIUM CHLORIDE 10 MEQ: 7.46 INJECTION, SOLUTION INTRAVENOUS at 14:39

## 2022-09-11 RX ADMIN — ALBUMIN (HUMAN) 25 G: 0.25 INJECTION, SOLUTION INTRAVENOUS at 17:45

## 2022-09-11 RX ADMIN — POTASSIUM CHLORIDE 10 MEQ: 7.46 INJECTION, SOLUTION INTRAVENOUS at 15:54

## 2022-09-11 RX ADMIN — FUROSEMIDE 20 MG: 10 INJECTION, SOLUTION INTRAMUSCULAR; INTRAVENOUS at 10:21

## 2022-09-11 RX ADMIN — CARVEDILOL 3.12 MG: 3.12 TABLET, FILM COATED ORAL at 10:21

## 2022-09-11 RX ADMIN — PANTOPRAZOLE SODIUM 40 MG: 40 TABLET, DELAYED RELEASE ORAL at 20:21

## 2022-09-11 RX ADMIN — POTASSIUM CHLORIDE 10 MEQ: 7.46 INJECTION, SOLUTION INTRAVENOUS at 13:37

## 2022-09-11 RX ADMIN — ASPIRIN 81 MG: 81 TABLET, CHEWABLE ORAL at 10:21

## 2022-09-11 RX ADMIN — ENOXAPARIN SODIUM 40 MG: 100 INJECTION SUBCUTANEOUS at 10:20

## 2022-09-11 RX ADMIN — CARVEDILOL 3.12 MG: 3.12 TABLET, FILM COATED ORAL at 18:32

## 2022-09-11 RX ADMIN — ALBUMIN (HUMAN) 25 G: 0.25 INJECTION, SOLUTION INTRAVENOUS at 13:13

## 2022-09-11 RX ADMIN — IPRATROPIUM BROMIDE 0.5 MG: 0.5 SOLUTION RESPIRATORY (INHALATION) at 08:56

## 2022-09-11 RX ADMIN — ALBUMIN (HUMAN) 25 G: 0.25 INJECTION, SOLUTION INTRAVENOUS at 05:45

## 2022-09-11 RX ADMIN — SERTRALINE HYDROCHLORIDE 25 MG: 50 TABLET ORAL at 10:21

## 2022-09-11 RX ADMIN — SODIUM CHLORIDE, PRESERVATIVE FREE 10 ML: 5 INJECTION INTRAVENOUS at 14:00

## 2022-09-11 RX ADMIN — IPRATROPIUM BROMIDE 0.5 MG: 0.5 SOLUTION RESPIRATORY (INHALATION) at 20:04

## 2022-09-11 RX ADMIN — POTASSIUM CHLORIDE 10 MEQ: 7.46 INJECTION, SOLUTION INTRAVENOUS at 16:55

## 2022-09-11 RX ADMIN — ALBUMIN (HUMAN) 25 G: 0.25 INJECTION, SOLUTION INTRAVENOUS at 23:46

## 2022-09-11 RX ADMIN — SODIUM CHLORIDE, PRESERVATIVE FREE 10 ML: 5 INJECTION INTRAVENOUS at 05:46

## 2022-09-11 RX ADMIN — FUROSEMIDE 20 MG: 10 INJECTION, SOLUTION INTRAMUSCULAR; INTRAVENOUS at 20:22

## 2022-09-11 NOTE — PROGRESS NOTES
Hospitalist Progress Note    Patient: Madan Paulson MRN: [id number]  CSN: [id number]    YOB: 1955  Age: 74 y.o. Sex: female    DOA: 9/8/2022 LOS:  LOS: 3 days          Chief Complaint:    acute CHF exacerbation    Assessment/Plan     51-year-old female with CAD, COPD, CHF, chronic leukocytosis, chronic alcohol use, memory impairment, and tobacco use is admitted with acute CHF exacerbation with bilateral lower extremity swelling, hypoalbuminemia due to protein-calorie malnutrition, and hepatic steatosis.      Acute CHF exacerbation with elevated troponin  Chronic leukocytosis with hepatosplenomegaly  Anemia   Elevated d-dimer   Hypoalbuminemia due to protein calorie malnutrition  Hepatic steatosis-suspect possible cirrhosis as well  Noncompliance with medications  Chronic longstanding alcohol use  COPD without exacerbation  Smoker  CAD  Cognitive impairment-chronic, lives with her daughter    Appreciate hematology following -anemia stable, work up so far negative for iron B12 deficiency and hemolysis, leukocytosis resolved, possibility of hyperglobulinemia is still under consideration and SPEP is pending    Continue to manage CHF exacerbation with fluid restriction, IV lasix, ASA and coreg     K is 3.0 today, replete with K runs     Albumin 1.8    Palliative care consult given overall poor prognosis     Prophylaxis: protonix PO, lovenox SQ    Disposition : Possibly can go home after K normal   Patient Active Problem List   Diagnosis Code    CAD (coronary artery disease) I25.10    Memory impairment R41.3    COPD (chronic obstructive pulmonary disease) ([facility] [address] J44.9    Acute exacerbation of CHF (congestive heart failure) (HCC) I50.9    Leukocytosis D72.829    Alcohol use Z72.89    Smoker F17.200    Poor historian Z78.9    Hepatic steatosis K76.0    Noncompliance with medications Z91.14    Elevated troponin R77.8    Hypoalbuminemia due to protein-calorie malnutrition (HCC) E88.09, E46    Anemia D64.9 Subjective:    Wants to go home     Review of systems:    Constitutional: denies fevers, chills, myalgias  Respiratory: denies SOB, cough  Cardiovascular: denies chest pain, palpitations  Gastrointestinal: denies nausea, vomiting, diarrhea      Vital signs/Intake and Output:  Visit Vitals  BP (!) 117/55   Pulse 85   Temp 98.5 °F (36.9 °C)   Resp 16   Ht 5' 1\" (1.549 m)   Wt 56.8 kg (125 lb 4.8 oz)   SpO2 92%   BMI 23.68 kg/m²     Current Shift:  No intake/output data recorded. Last three shifts:  09/09 1901 - 09/11 0700  In: 240 [P.O.:240]  Out: -     Exam:    General: Well developed, alert, NAD, OX3  Head/Neck: NCAT, supple, No masses, No lymphadenopathy  CVS:Regular rate and rhythm, no M/R/G, S1/S2 heard, no thrill  Lungs:Clear to auscultation bilaterally, no wheezes, rhonchi, or rales  Abdomen: Soft, Nontender, No distention, Normal Bowel sounds, No hepatomegaly  Extremities: No C/C/E, pulses palpable 2+  Skin:normal texture and turgor, no rashes, no lesions  Neuro:grossly normal , follows commands  Psych:appropriate                Labs: Results:       Chemistry Recent Labs     09/11/22  0245 09/10/22  0257 09/09/22  0140   GLU 72* 84 94    140 139   K 3.0* 3.2* 3.5    109 107   CO2 26 27 26   BUN 5* 6* 6*   CREA 0.60 0.66 0.86   CA 8.2* 7.9* 7.8*   AGAP 5 4 6   BUCR 8* 9* 7*   * 210* 210*   TP 7.1 6.9 7.0   ALB 2.8* 1.8* 1.4*   GLOB 4.3* 5.1* 5.6*   AGRAT 0.7* 0.4* 0.3*        CBC w/Diff Recent Labs     09/11/22  0245 09/10/22  0257 09/09/22  0140 09/08/22  1630   WBC 12.7 13.0 15.9* 21.3*   RBC 2.70* 2.68* 2.69* 3.18*   HGB 8.8* 8.8* 8.9* 10.6*   HCT 25.5* 25.4* 25.7* 30.4*    148 158 198   GRANS  --   --   --  81*   LYMPH  --   --   --  8*   EOS  --   --   --  4        Cardiac Enzymes No results for input(s): CPK, CKND1, AURELIA in the last 72 hours.     No lab exists for component: CKRMB, TROIP   Coagulation No results for input(s): PTP, INR, APTT, INREXT, INREXT in the last 72 hours. Lipid Panel Lab Results   Component Value Date/Time    Cholesterol, total 88 09/09/2022 01:40 AM    HDL Cholesterol 21 (L) 09/09/2022 01:40 AM    LDL, calculated 49.6 09/09/2022 01:40 AM    VLDL, calculated 17.4 09/09/2022 01:40 AM    Triglyceride 87 09/09/2022 01:40 AM    CHOL/HDL Ratio 4.2 09/09/2022 01:40 AM      BNP No results for input(s): BNPP in the last 72 hours.    Liver Enzymes Recent Labs     09/11/22  0245   TP 7.1   ALB 2.8*   *        Thyroid Studies No results found for: T4, T3U, TSH, TSHEXT, TSHEXT     Procedures/imaging: see electronic medical records for all procedures/Xrays and details which were not copied into this note but were reviewed prior to creation of Savage Rose MD

## 2022-09-11 NOTE — PROGRESS NOTES
Problem: Falls - Risk of  Goal: *Absence of Falls  Description: Document Josiah Brewerer Fall Risk and appropriate interventions in the flowsheet.   Outcome: Progressing Towards Goal  Note: Fall Risk Interventions:  Mobility Interventions: Patient to call before getting OOB    Mentation Interventions: Adequate sleep, hydration, pain control

## 2022-09-12 VITALS
DIASTOLIC BLOOD PRESSURE: 59 MMHG | SYSTOLIC BLOOD PRESSURE: 122 MMHG | WEIGHT: 127.6 LBS | BODY MASS INDEX: 24.09 KG/M2 | OXYGEN SATURATION: 100 % | HEIGHT: 61 IN | RESPIRATION RATE: 18 BRPM | HEART RATE: 82 BPM | TEMPERATURE: 98.3 F

## 2022-09-12 LAB
ANION GAP SERPL CALC-SCNC: 8 MMOL/L (ref 3–18)
BUN SERPL-MCNC: 6 MG/DL (ref 7–18)
BUN/CREAT SERPL: 8 (ref 12–20)
CALCIUM SERPL-MCNC: 9.1 MG/DL (ref 8.5–10.1)
CHLORIDE SERPL-SCNC: 103 MMOL/L (ref 100–111)
CO2 SERPL-SCNC: 23 MMOL/L (ref 21–32)
CREAT SERPL-MCNC: 0.73 MG/DL (ref 0.6–1.3)
GLUCOSE SERPL-MCNC: 80 MG/DL (ref 74–99)
KAPPA LC FREE SER-MCNC: 117.5 MG/L (ref 3.3–19.4)
KAPPA LC FREE/LAMBDA FREE SER: 1.27 {RATIO} (ref 0.26–1.65)
LAMBDA LC FREE SERPL-MCNC: 92.4 MG/L (ref 5.7–26.3)
POTASSIUM SERPL-SCNC: 3.5 MMOL/L (ref 3.5–5.5)
RETICS/RBC NFR AUTO: 2.9 % (ref 0.5–2.5)
SODIUM SERPL-SCNC: 134 MMOL/L (ref 136–145)

## 2022-09-12 PROCEDURE — 87340 HEPATITIS B SURFACE AG IA: CPT

## 2022-09-12 PROCEDURE — 86803 HEPATITIS C AB TEST: CPT

## 2022-09-12 PROCEDURE — 99232 SBSQ HOSP IP/OBS MODERATE 35: CPT | Performed by: NURSE PRACTITIONER

## 2022-09-12 PROCEDURE — 85045 AUTOMATED RETICULOCYTE COUNT: CPT

## 2022-09-12 PROCEDURE — P9047 ALBUMIN (HUMAN), 25%, 50ML: HCPCS | Performed by: FAMILY MEDICINE

## 2022-09-12 PROCEDURE — 94760 N-INVAS EAR/PLS OXIMETRY 1: CPT

## 2022-09-12 PROCEDURE — 80048 BASIC METABOLIC PNL TOTAL CA: CPT

## 2022-09-12 PROCEDURE — 74011250636 HC RX REV CODE- 250/636: Performed by: FAMILY MEDICINE

## 2022-09-12 PROCEDURE — 86704 HEP B CORE ANTIBODY TOTAL: CPT

## 2022-09-12 PROCEDURE — 36415 COLL VENOUS BLD VENIPUNCTURE: CPT

## 2022-09-12 PROCEDURE — 74011000250 HC RX REV CODE- 250: Performed by: FAMILY MEDICINE

## 2022-09-12 PROCEDURE — 74011250637 HC RX REV CODE- 250/637: Performed by: FAMILY MEDICINE

## 2022-09-12 PROCEDURE — 82525 ASSAY OF COPPER: CPT

## 2022-09-12 PROCEDURE — 94640 AIRWAY INHALATION TREATMENT: CPT

## 2022-09-12 RX ORDER — SERTRALINE HYDROCHLORIDE 25 MG/1
25 TABLET, FILM COATED ORAL DAILY
Qty: 30 TABLET | Refills: 0 | Status: SHIPPED | OUTPATIENT
Start: 2022-09-13

## 2022-09-12 RX ORDER — POTASSIUM CHLORIDE 20 MEQ/1
20 TABLET, EXTENDED RELEASE ORAL DAILY
Qty: 30 TABLET | Refills: 0 | Status: SHIPPED | OUTPATIENT
Start: 2022-09-12

## 2022-09-12 RX ORDER — CARVEDILOL 3.12 MG/1
3.12 TABLET ORAL 2 TIMES DAILY WITH MEALS
Qty: 60 TABLET | Refills: 0 | Status: SHIPPED | OUTPATIENT
Start: 2022-09-13

## 2022-09-12 RX ORDER — FUROSEMIDE 20 MG/1
20 TABLET ORAL 2 TIMES DAILY
Qty: 60 TABLET | Refills: 0 | Status: SHIPPED | OUTPATIENT
Start: 2022-09-12

## 2022-09-12 RX ORDER — GUAIFENESIN 100 MG/5ML
81 LIQUID (ML) ORAL DAILY
Qty: 30 TABLET | Refills: 0 | Status: SHIPPED | OUTPATIENT
Start: 2022-09-13

## 2022-09-12 RX ADMIN — PANTOPRAZOLE SODIUM 40 MG: 40 TABLET, DELAYED RELEASE ORAL at 09:14

## 2022-09-12 RX ADMIN — SODIUM CHLORIDE, PRESERVATIVE FREE 10 ML: 5 INJECTION INTRAVENOUS at 14:00

## 2022-09-12 RX ADMIN — CARVEDILOL 3.12 MG: 3.12 TABLET, FILM COATED ORAL at 09:14

## 2022-09-12 RX ADMIN — ENOXAPARIN SODIUM 40 MG: 100 INJECTION SUBCUTANEOUS at 09:14

## 2022-09-12 RX ADMIN — ALBUMIN (HUMAN) 25 G: 0.25 INJECTION, SOLUTION INTRAVENOUS at 06:24

## 2022-09-12 RX ADMIN — ASPIRIN 81 MG: 81 TABLET, CHEWABLE ORAL at 09:14

## 2022-09-12 RX ADMIN — ALBUMIN (HUMAN) 25 G: 0.25 INJECTION, SOLUTION INTRAVENOUS at 12:48

## 2022-09-12 RX ADMIN — SERTRALINE HYDROCHLORIDE 25 MG: 50 TABLET ORAL at 09:15

## 2022-09-12 RX ADMIN — IPRATROPIUM BROMIDE 0.5 MG: 0.5 SOLUTION RESPIRATORY (INHALATION) at 07:54

## 2022-09-12 RX ADMIN — CARVEDILOL 3.12 MG: 3.12 TABLET, FILM COATED ORAL at 17:00

## 2022-09-12 RX ADMIN — FUROSEMIDE 20 MG: 10 INJECTION, SOLUTION INTRAMUSCULAR; INTRAVENOUS at 09:14

## 2022-09-12 RX ADMIN — IPRATROPIUM BROMIDE 0.5 MG: 0.5 SOLUTION RESPIRATORY (INHALATION) at 20:25

## 2022-09-12 RX ADMIN — ACETAMINOPHEN 650 MG: 325 TABLET ORAL at 09:14

## 2022-09-12 NOTE — PROGRESS NOTES
Palliative Medicine Consult    Patient Name: Lizbeth Helm  YOB: 1955    Date of Initial Consult: 9/9/2022  Date of follow up: 9/12/2022  Reason for Consult: Goals of care discussions  Requesting Provider: Dr. Keila Hampton  Primary Care Physician: Halle Diane PA-C      SUMMARY:   Lizbeth Helm is a 74 y.o. with a past history of ,CAD, COPD, CHF, chronic leukocytosis, chronic alcohol use, memory impairment, and tobacco use  who was admitted on 9/8/2022 from home with a diagnosis of acute CHF exacerbation with bilateral lower extremity swelling, hypoalbuminemia due to protein-calorie malnutrition, and hepatic steatosis. Current medical issues leading to Palliative Medicine involvement include: goals of care discussions. 9/12/2022: Patient is awake, alert, and oriented x 4, more engaged in goals of care discussions today. PALLIATIVE DIAGNOSES:   Goals of care discussions  Advance care planning  Acute on chronic CHF  Debility, protein calorie malnutrition/advanced age       PLAN:   9/12/2022: Palliative medicine team including Noel Severe, RN and I met with patient at patient's bedside. Patient is awake, alert, and oriented x 4, more engaged in goals of care discussions today. Patient is ready to go home (lives with her daughter Arabella Caro). Patient states that she has 4 children, and that she completed an AMD at her PCP office naming her daughter Arvind Pena as her MPOA. Encouraged patient to make sure Arabella Caro has a copy of this document in case it is needed one day. We have not been successful with speaking to Arabella Caro, no answer, voicemail left. Discussed the benefits and burdens of CPR in the event of cardiopulmonary arrest.  Discussed the benefits and burdens of intubation in the event of respiratory decline pre-arrest. Patient admits this is something she has not really thought about but agrees to talk with her family and PCP. Patient requests full code with full interventions at this time. Will sign off as goals of care have been established. Please re-consult should it be warranted. Thank you for the opportunity to provide care to this patient. See previous discussions below:     9/9/2022: Goals of care discussions: Palliative medicine team including Alyson Olson RN and I met with patient at patient's bedside. Patient is awake, drowsy, not engaging in goals of care discussions. She is able to tell us she came to the hospital because her legs were swelling, but did not offer any further details. She also told us that she lives with her daughter Babatunde Gloria, it is unclear of her baseline level of functioning. Called patient's daughter Babatunde Gloria, no answer, voice message left requesting a call back. At this time by chart review, patient is a full code with full interventions. Further goals of care discussions pending discussion with daughter Babatunde Golria and/or patient engagement in goals of care discussions. Advance care planning: No AMD on file, patient states that she has 3 children, we have Babatunde Watkins's contact information, but she was unclear regarding the names of her other children. In the state [facility], in absence of AMD documentation, patient's 3 children are legal next of kin. Acute on chronic CHF, presented with shortness of breath and bilateral lower extremity edema, per chart review she is nonadherent to her Lasix and cardiology follow-ups as an outpatient. She also suffers from severe protein calorie malnutrition with hypoalbuminemia, resulting in third spacing. Debility, protein calorie malnutrition/advanced age: 72-year-old female who lives with her daughter Babatunde Gloria, unsure of baseline functional status but at this time needs assistance with all ADLs. Nurses at bedside providing medications, patient appeared to have a difficult time swallowing her pills. Patient has hypoalbuminemia, and gaiting protein calorie malnutrition.   She is a 72-year-old female with chronic comorbidities. Adherence to her treatment recommendations/follow-up appointments are questionable.   Initial consult note routed to primary continuity provider  Communicated plan of care with: Palliative IDT       GOALS OF CARE / TREATMENT PREFERENCES:   [====Goals of Care====]  GOALS OF CARE:Full code with full interventions  Patient/Health Care Proxy Stated Goals: Prolong life      TREATMENT PREFERENCES:   Code Status: Full Code    Advance Care Planning:  Advance Care Planning 9/12/2022   Patient's Healthcare Decision Maker is: Named in scanned ACP document   Confirm Advance Directive Yes, not on file   Patient Would Like to Complete Advance Directive -       Medical Interventions: Full interventions            The palliative care team has discussed with patient / health care proxy about goals of care / treatment preferences for patient.  [====Goals of Care====]         HISTORY:     History obtained from: patient (limited), chart    CHIEF COMPLAINT: BLE edema, Shortness of breath    HPI/SUBJECTIVE:    The patient is:   [] Verbal and participatory  [x] Non-participatory due to:   Drowsy, minimally engaged in goals of care discussions     Clinical Pain Assessment (nonverbal scale for severity on nonverbal patients):   Clinical Pain Assessment  Severity: 0            FUNCTIONAL ASSESSMENT:     Palliative Performance Scale (PPS):  PPS: 40       PSYCHOSOCIAL/SPIRITUAL SCREENING:     Advance Care Planning:  Advance Care Planning 9/12/2022   Patient's Healthcare Decision Maker is: Named in scanned ACP document   Confirm Advance Directive Yes, not on file   Patient Would Like to Complete Advance Directive -        Any spiritual / Jainism concerns: difficult to assess due to drowsiness  [] Yes /  [] No    Caregiver Burnout:  [] Yes /  [] No /  [x] No Caregiver Present      Anticipatory grief assessment: difficult to assess due to drowsiness  [] Normal  / [] Maladaptive               REVIEW OF SYSTEMS:     Positive and pertinent negative findings in ROS are noted above in HPI. The following systems were [x] reviewed / [] unable to be reviewed as noted in HPI  Other findings are noted below. Systems: constitutional, ears/nose/mouth/throat, respiratory, gastrointestinal, genitourinary, musculoskeletal, integumentary, neurologic, psychiatric, endocrine. Positive findings noted below. Modified ESAS Completed by: provider   Fatigue: 4 Drowsiness: 7     Pain: 0   Anxiety: 0 Nausea: 0   Anorexia: 0 Dyspnea: 0     Constipation: No              PHYSICAL EXAM:     From RN flowsheet:  Wt Readings from Last 3 Encounters:   09/12/22 57.9 kg (127 lb 9.6 oz)     Blood pressure (!) 112/43, pulse 82, temperature 98.2 °F (36.8 °C), resp. rate 18, height 5' 1\" (1.549 m), weight 57.9 kg (127 lb 9.6 oz), SpO2 99 %.     Pain Scale 1: Numeric (0 - 10)  Pain Intensity 1: 0     Pain Location 1: Leg  Pain Orientation 1: Left, Right  Pain Description 1: Aching  Pain Intervention(s) 1: Medication (see MAR)      Constitutional: awake, alert, NAD, more engaged in goals of care discussions today  Eyes: pupils equal, anicteric  ENMT: no nasal discharge, moist mucous membranes  Cardiovascular: regular rhythm, distal pulses intact  Respiratory: breathing not labored, symmetric  Gastrointestinal: soft non-tender   Musculoskeletal: no deformity, no tenderness to palpation  Skin: warm, dry  Neurologic: following commands, moving all extremities, oriented x 4  Psychiatric: flat affect, no hallucinations         HISTORY:     Principal Problem:    Acute exacerbation of CHF (congestive heart failure) ([facility]) (9/8/2022)    Active Problems:    CAD (coronary artery disease) (9/8/2022)      Memory impairment (9/8/2022)      COPD (chronic obstructive pulmonary disease) ([facility] [address] (9/8/2022)      Leukocytosis (9/8/2022)      Alcohol use (9/8/2022)      Smoker (9/8/2022)      Poor historian (9/9/2022)      Hepatic steatosis (9/9/2022)      Noncompliance with medications (9/9/2022)      Elevated troponin (9/9/2022)      Hypoalbuminemia due to protein-calorie malnutrition ([facility] [address] (9/9/2022)      Anemia (9/9/2022)    Past Medical History:   Diagnosis Date    Alcohol use 9/8/2022    CAD (coronary artery disease) 9/8/2022    Chronic obstructive pulmonary disease (HCC)     COPD (chronic obstructive pulmonary disease) ([facility] [address] 9/8/2022    [facility]     Hypertension     Memory impairment 9/8/2022    Smoker 9/8/2022      Past Surgical History:   Procedure Laterality Date    HX GYN        Family History   Problem Relation Age of Onset    Hypertension Mother     Elevated Lipids Mother     Heart Disease Mother     Diabetes Mother     Hypertension Father     Heart Disease Father     Elevated Lipids Father     Kidney Disease Father     Seizures Daughter       History reviewed, no pertinent family history. Social History     Tobacco Use    Smoking status: Every Day     Packs/day: 0.50     Types: Cigarettes    Smokeless tobacco: Never   Substance Use Topics    Alcohol use:  Yes     Alcohol/week: 7.0 standard drinks     Types: 7 Cans of beer per week     No Known Allergies   Current Facility-Administered Medications   Medication Dose Route Frequency    pantoprazole (PROTONIX) tablet 40 mg  40 mg Oral BID    sertraline (ZOLOFT) tablet 25 mg  25 mg Oral DAILY    carvediloL (COREG) tablet 3.125 mg  3.125 mg Oral BID WITH MEALS    aspirin chewable tablet 81 mg  81 mg Oral DAILY    furosemide (LASIX) injection 20 mg  20 mg IntraVENous BID    albumin human 25% (BUMINATE) solution 25 g  25 g IntraVENous Q6H    ipratropium (ATROVENT) 0.02 % nebulizer solution 0.5 mg  0.5 mg Nebulization BID RT    acetaminophen (TYLENOL) tablet 650 mg  650 mg Oral Q6H PRN    Or    acetaminophen (TYLENOL) suppository 650 mg  650 mg Rectal Q6H PRN    polyethylene glycol (MIRALAX) packet 17 g  17 g Oral DAILY PRN    ondansetron (ZOFRAN ODT) tablet 4 mg  4 mg Oral Q8H PRN    Or    ondansetron (ZOFRAN) injection 4 mg  4 mg IntraVENous Q6H PRN    enoxaparin (LOVENOX) injection 40 mg  40 mg SubCUTAneous DAILY    sodium chloride (NS) flush 5-40 mL  5-40 mL IntraVENous Q8H    sodium chloride (NS) flush 5-40 mL  5-40 mL IntraVENous PRN    LORazepam (ATIVAN) tablet 1 mg  1 mg Oral Q1H PRN    Or    LORazepam (ATIVAN) 2 mg/mL injection 1 mg  1 mg IntraVENous Q1H PRN    LORazepam (ATIVAN) tablet 2 mg  2 mg Oral Q1H PRN    Or    LORazepam (ATIVAN) 2 mg/mL injection 2 mg  2 mg IntraVENous Q1H PRN    LORazepam (ATIVAN) 2 mg/mL injection 3 mg  3 mg IntraVENous Q15MIN PRN          LAB AND IMAGING FINDINGS:     Lab Results   Component Value Date/Time    WBC 12.7 09/11/2022 02:45 AM    HGB 8.8 (L) 09/11/2022 02:45 AM    PLATELET 182 32/00/0682 02:45 AM     Lab Results   Component Value Date/Time    Sodium 138 09/11/2022 02:45 AM    Potassium 3.0 (L) 09/11/2022 02:45 AM    Chloride 107 09/11/2022 02:45 AM    CO2 26 09/11/2022 02:45 AM    BUN 5 (L) 09/11/2022 02:45 AM    Creatinine 0.60 09/11/2022 02:45 AM    Calcium 8.2 (L) 09/11/2022 02:45 AM      Lab Results   Component Value Date/Time    Alk. phosphatase 154 (H) 09/11/2022 02:45 AM    Protein, total 7.1 09/11/2022 02:45 AM    Albumin 2.8 (L) 09/11/2022 02:45 AM    Globulin 4.3 (H) 09/11/2022 02:45 AM     No results found for: INR, PTMR, PTP, PT1, PT2, APTT, INREXT, INREXT   Lab Results   Component Value Date/Time    Iron 80 09/09/2022 08:30 AM    TIBC 70 (L) 09/09/2022 08:30 AM    Iron % saturation 114 (H) 09/09/2022 08:30 AM    Ferritin 152 09/09/2022 08:30 AM      No results found for: PH, PCO2, PO2  No components found for: GLPOC   No results found for: CPK, CKMB             Total time: 25 minutes  Counseling / coordination time, spent as noted above:   > 50% counseling / coordination?: yes, patient, family (attempted), medical team    Prolonged service was provided for  []30 min   []75 min in face to face time in the presence of the patient, spent as noted above.   Time Start:   Time End:

## 2022-09-12 NOTE — PROGRESS NOTES
Problem: Falls - Risk of  Goal: *Absence of Falls  Description: Document Rajiv Cody Fall Risk and appropriate interventions in the flowsheet.   Outcome: Progressing Towards Goal  Note: Fall Risk Interventions:  Mobility Interventions: Assess mobility with egress test, Communicate number of staff needed for ambulation/transfer, OT consult for ADLs, Patient to call before getting OOB, PT Consult for mobility concerns, PT Consult for assist device competence, Strengthening exercises (ROM-active/passive), Utilize walker, cane, or other assistive device    Mentation Interventions: Adequate sleep, hydration, pain control, Toileting rounds, Update white board    Medication Interventions: Evaluate medications/consider consulting pharmacy, Patient to call before getting OOB, Teach patient to arise slowly    Elimination Interventions: Call light in reach, Patient to call for help with toileting needs, Stay With Me (per policy), Toilet paper/wipes in reach, Toileting schedule/hourly rounds              Problem: Patient Education: Go to Patient Education Activity  Goal: Patient/Family Education  Outcome: Progressing Towards Goal     Problem: Pain  Goal: *Control of Pain  Outcome: Progressing Towards Goal     Problem: Patient Education: Go to Patient Education Activity  Goal: Patient/Family Education  Outcome: Progressing Towards Goal

## 2022-09-12 NOTE — PROGRESS NOTES
Physician Progress Note      PATIENTStadora Land  CSN #:                  [id number]  :                       1955  ADMIT DATE:       2022 3:54 PM  [address] [place] DATE:  RESPONDING  PROVIDER #:        Tad Millard MD          QUERY TEXT:    Patient admitted with CHF  exacerbation. If possible, please document in progress notes and discharge summary if you are evaluating and /or treating any of the following: The medical record reflects the following:  Risk Factors: hypoalbuminemia, poor appetite, hepatic steatosis, long standing alcohol use    Clinical Indicators:  2022, H&P, Dr. Kranthi Alane:  Breandrewn Ludivina lower extremity swelling, hypoalbuminemia due to protein-calorie malnutrition, and hepatic steatosis. Awake, appears older than stated age, cachectic, does not want to answer questions, no distress. \"    Treatment:  Already on Boost supplementation,  Albumin infusions    ASPEN Criteria:  https://aspenjournals. onlinelibrary. robbins. com/doi/full/10.1177/5953373138711801    Thank you,  ALEX Robbins RN, CDS  [email or web address]  Options provided:  -- Protein calorie malnutrition mild  -- Protein calorie malnutrition moderate  -- Protein calorie malnutrition severe  -- Underweight with BMI ***  -- Other - I will add my own diagnosis  -- Disagree - Not applicable / Not valid  -- Disagree - Clinically unable to determine / Unknown  -- Refer to Clinical Documentation Reviewer    PROVIDER RESPONSE TEXT:    This patient has moderate protein calorie malnutrition. Query created by: Bebeto Rios on 2022 11:18 AM      QUERY TEXT:    Pt admitted with acute ChF. Pt noted to have memory impairment. If possible, please document in the progress notes and discharge summary if you are evaluating and / or treating any of the following:       The medical record reflects the following:  Risk Factors: malnutrition, chronic daily alcohol use, hypoalbuminemia    Clinical Indicators:  2022, JENNIFER,  Ari-Toby:  \"memory impairment, and tobacco use is admitted with acute CHF exacerbation with bilateral lower extremity swelling, hypoalbuminemia due to protein-calorie malnutrition. Chronic longstanding alcohol use-she drinks 3-4 beers nightly. General: Well developed, alert, NAD, OX3. Awake, appears older than stated age, cachectic, does not want to answer questions, no distress. \"    Treatment:   Boost supplement, albumin infusions, CIWA protocol to monitor for alcohol withdrawal    Thank you,  ALEX Robbins RN, CDS  [email or web address]  Options provided:  -- Alcoholic encephalopathy  -- Toxic encephalopathy  -- Wernicke?s encephalopathy  -- Other - I will add my own diagnosis  -- Disagree - Not applicable / Not valid  -- Disagree - Clinically unable to determine / Unknown  -- Refer to Clinical Documentation Reviewer    PROVIDER RESPONSE TEXT:    Provider disagreed with this query. Query created by:  Desmond Lennox on 9/9/2022 11:35 AM      Electronically signed by:  Solitario Salazar MD 9/12/2022 10:55 AM

## 2022-09-12 NOTE — PROGRESS NOTES
Palliative Medicine    CODE STATUS: FULL CODE    AMD Status: none on file. She states that she has one naming her daughter, Nikko Machado, as her MPOA. It is on file at her PCP     9/12/2022 1035 Seen today in room 337 along with Ge Youssef NP. Lying in bed on her side. Awake, alert. Oriented x 4. More alert and engaged than when last seen on 9/9. States he is hoping to be discharged soon where she lives with her daughter, mother, and one grandchild. States she has four children. Asked if she had ever completed and AMD and she said she did at her PCP office naming Nikko Machado, daughter, as her MPOA. Reviewed code status options defining FULL CODE and DNR. She said she is open to all modalities of resuscitation. Highly encouraged her to continue conversations with her family as she navigates her health issues and continues to age. She agreed to do so. Disposition plan: anticipate home with family support. After meeting with patient, the goals of care have been defined. Code status remains: FULL CODE  AMD status: not on file. Sates her daughter is her choice as MPOA and that she has completed documents with her PCP. Will sign off but remain available for reconsult as needed/if appropriate.      Thank you for the Palliative Medicine consult and allowing us to participate in the care of Germaine López RN, MSN  Palliative Medicine   [phone]

## 2022-09-12 NOTE — PROGRESS NOTES
Phone: [phone]  Paging : [phone]      Hematology / Oncology Progress Note    Admit Date: 9/8/2022    Assessment:     Principal Problem:    Acute exacerbation of CHF (congestive heart failure) ([facility] (9/8/2022)    Active Problems:    CAD (coronary artery disease) (9/8/2022)      Memory impairment (9/8/2022)      COPD (chronic obstructive pulmonary disease) ([facility] (9/8/2022)      Leukocytosis (9/8/2022)      Alcohol use (9/8/2022)      Smoker (9/8/2022)      Poor historian (9/9/2022)      Hepatic steatosis (9/9/2022)      Noncompliance with medications (9/9/2022)      Elevated troponin (9/9/2022)      Hypoalbuminemia due to protein-calorie malnutrition ([facility] (9/9/2022)      Anemia (9/9/2022)        Plan:   - Leukocytosis resolved. - Anemia + hypergammaglobulinemia work up pending - SPEP, SFLC in process. So far B12, folate, LDH, Haptoglobin, and Iron studies were unremarkable. Possibly related to patient's cirrhosis seen on US as well as splenomegaly seen on CT A/P.   - Check retic count, copper, Hepatitis B/C labs, and peripheral smear (ordered this AM)  - Will continue to follow the patient. If patient is ready for discharge, can follow up as outpatient with our clinic. Subjective:   - States she feels well and wants to go home  - Denies any CP, SOB, cough, abd pain, N/V, diarrhea, or rash  - She states she has lacked an appetite for the last 2 years. She cannot remember what she ate yesterday.      Objective:     Patient Vitals for the past 24 hrs:   BP Temp Pulse Resp SpO2 Weight   09/12/22 0757 -- -- -- -- 96 % --   09/12/22 0742 (!) 113/59 97.8 °F (36.6 °C) 85 16 97 % --   09/12/22 0438 (!) 122/52 98.4 °F (36.9 °C) 84 16 98 % --   09/12/22 0039 (!) 122/59 99.2 °F (37.3 °C) 78 18 96 % 57.9 kg (127 lb 9.6 oz)   09/11/22 2008 (!) 111/59 98.3 °F (36.8 °C) 84 16 97 % --   09/11/22 1716 (!) 103/55 98.4 °F (36.9 °C) 83 16 100 % --   09/11/22 0856 -- -- -- -- 92 % --       No intake or output data in the 24 hours ending 22 0811    Temp (24hrs), Av.4 °F (36.9 °C), Min:97.8 °F (36.6 °C), Max:99.2 °F (37.3 °C)      12 Point ROS is negative unless otherwise mentioned in HPI/subjective. Exam:     General: Tired and cachetic in appearance   Head/Neck: NCAT, supple, No masses, No lymphadenopathy  CVS:Regular rate and rhythm, no M/R/G, S1/S2 heard, no thrill  Lungs:Clear to auscultation bilaterally, no wheezes, rhonchi, or rales  Abdomen: Soft, Nontender, No distention, Normal Bowel sounds, No hepatomegaly  Extremities: No C/C/E, pulses palpable 2+  Skin:normal texture and turgor, no rashes, no lesions  Neuro:grossly normal , follows commands  Psych:appropriate            No results found for this or any previous visit (from the past 24 hour(s)).   Current Facility-Administered Medications   Medication Dose Route Frequency Provider Last Rate Last Admin    pantoprazole (PROTONIX) tablet 40 mg  40 mg Oral BID Saji Pickens MD   40 mg at 22    sertraline (ZOLOFT) tablet 25 mg  25 mg Oral DAILY Saji Pickens MD   25 mg at 22 1021    carvediloL (COREG) tablet 3.125 mg  3.125 mg Oral BID WITH MEALS Irene HERRMANN MD   3.125 mg at 22 1832    aspirin chewable tablet 81 mg  81 mg Oral DAILY Saji Pickens MD   81 mg at 22 1021    furosemide (LASIX) injection 20 mg  20 mg IntraVENous BID Saji Pickens MD   20 mg at 22    albumin human 25% (BUMINATE) solution 25 g  25 g IntraVENous Q6H Saji Pickens MD   25 g at 22 0624    ipratropium (ATROVENT) 0.02 % nebulizer solution 0.5 mg  0.5 mg Nebulization BID RT Saji Pickens MD   0.5 mg at 22 0754    acetaminophen (TYLENOL) tablet 650 mg  650 mg Oral Q6H PRN Saji Pickens MD   650 mg at 22 2350    Or    acetaminophen (TYLENOL) suppository 650 mg  650 mg Rectal Q6H PRN Saji Pickens MD        polyethylene glycol (MIRALAX) packet 17 g  17 g Oral DAILY PRN Eleno Mott MD        ondansetron (ZOFRAN ODT) tablet 4 mg  4 mg Oral Q8H PRN Eleno Mott MD        Or    ondans[facility]ron [facility]) injection 4 mg  4 mg IntraVENous Q6H PRJUWAN Mott MD        enoxaparin (LOVENOX) injection 40 mg  40 mg SubCUTAneous DAILY Eleno Mott MD   40 mg at 09/11/22 1020    sodium chloride (NS) flush 5-40 mL  5-40 mL IntraVENous Q8H Eleno Mott MD   10 mL at 09/11/22 1400    sodium chloride (NS) flush 5-40 mL  5-40 mL IntraVENous PRN Eleno Mott MD        LORazepam (ATIVAN) tablet 1 mg  1 mg Oral Q1H PRN Eleno Mott MD        Or    LORazepam (ATIVAN) 2 mg/mL injection 1 mg  1 mg IntraVENous Q1H PRJUWAN Mott MD        LORazepam (ATIVAN) tablet 2 mg  2 mg Oral Q1H PRJUWAN Mott MD   2 mg at 09/09/22 0205    Or    LORazepam (ATIVAN) 2 mg/mL injection 2 mg  2 mg IntraVENous Q1H PRJUWAN Mott MD        LORazepam (ATIVAN) 2 mg/mL injection 3 mg  3 mg IntraVENous Q15MIN PRMD Aston Moreno MD

## 2022-09-12 NOTE — PROGRESS NOTES
Hospitalist Progress Note    Patient: Vernon Antoine MRN: [id number]  CSN: [id number]    YOB: 1955  Age: 74 y.o. Sex: female    DOA: 9/8/2022 LOS:  LOS: 4 days          Chief Complaint:    acute CHF exacerbation    Assessment/Plan     53-year-old female with CAD, COPD, CHF, chronic leukocytosis, chronic alcohol use, memory impairment, and tobacco use is admitted with acute CHF exacerbation with bilateral lower extremity swelling, hypoalbuminemia due to protein-calorie malnutrition, and hepatic steatosis.      Acute CHF exacerbation with elevated troponin  Chronic leukocytosis with hepatosplenomegaly  Anemia   Elevated d-dimer   Hypoalbuminemia due to protein calorie malnutrition  Hepatic steatosis-suspect possible cirrhosis as well  Noncompliance with medications  Chronic longstanding alcohol use  COPD without exacerbation  Smoker  CAD  Cognitive impairment-chronic, lives with her daughter    Appreciate hematology following -anemia stable, work up so far negative for iron B12 deficiency and hemolysis, leukocytosis resolved, possibility of hyperglobulinemia is still under consideration and SPEP is pending    Continue to manage CHF exacerbation with fluid restriction, IV lasix, ASA and coreg     Hypokalemia improved     Low albumin improved     Palliative care consult given overall poor prognosis     Prophylaxis: protonix PO, lovenox SQ    Disposition : ?home today   Patient Active Problem List   Diagnosis Code    CAD (coronary artery disease) I25.10    Memory impairment R41.3    COPD (chronic obstructive pulmonary disease) ([facility] J44.9    Acute exacerbation of CHF (congestive heart failure) ([facility]) I50.9    Leukocytosis D72.829    Alcohol use Z72.89    Smoker F17.200    Poor historian Z78.9    Hepatic steatosis K76.0    Noncompliance with medications Z91.14    Elevated troponin R77.8    Hypoalbuminemia due to protein-calorie malnutrition ([facility] E88.09, E46    Anemia D64.9       Subjective:    Wants to go home     Review of systems:    Constitutional: denies fevers, chills, myalgias  Respiratory: denies SOB, cough  Cardiovascular: denies chest pain, palpitations  Gastrointestinal: denies nausea, vomiting, diarrhea      Vital signs/Intake and Output:  Visit Vitals  BP (!) 113/59   Pulse 85   Temp 97.8 °F (36.6 °C)   Resp 16   Ht 5' 1\" (1.549 m)   Wt 57.9 kg (127 lb 9.6 oz)   SpO2 96%   BMI 24.11 kg/m²     Current Shift:  No intake/output data recorded. Last three shifts:  09/10 1901 - 09/12 0700  In: 240 [P.O.:240]  Out: -     Exam:    General: Well developed, alert, NAD, OX3  Head/Neck: NCAT, supple, No masses, No lymphadenopathy  CVS:Regular rate and rhythm, no M/R/G, S1/S2 heard, no thrill  Lungs:Clear to auscultation bilaterally, no wheezes, rhonchi, or rales  Abdomen: Soft, Nontender, No distention, Normal Bowel sounds, No hepatomegaly  Extremities: No C/C/E, pulses palpable 2+  Skin:normal texture and turgor, no rashes, no lesions  Neuro:grossly normal , follows commands  Psych:appropriate                Labs: Results:       Chemistry Recent Labs     09/11/22  0245 09/10/22  0257   GLU 72* 84    140   K 3.0* 3.2*    109   CO2 26 27   BUN 5* 6*   CREA 0.60 0.66   CA 8.2* 7.9*   AGAP 5 4   BUCR 8* 9*   * 210*   TP 7.1 6.9   ALB 2.8* 1.8*   GLOB 4.3* 5.1*   AGRAT 0.7* 0.4*        CBC w/Diff Recent Labs     09/11/22  0245 09/10/22  0257   WBC 12.7 13.0   RBC 2.70* 2.68*   HGB 8.8* 8.8*   HCT 25.5* 25.4*    148        Cardiac Enzymes No results for input(s): CPK, CKND1, AURELIA in the last 72 hours. No lab exists for component: CKRMB, TROIP   Coagulation No results for input(s): PTP, INR, APTT, INREXT, INREXT in the last 72 hours.     Lipid Panel Lab Results   Component Value Date/Time    Cholesterol, total 88 09/09/2022 01:40 AM    HDL Cholesterol 21 (L) 09/09/2022 01:40 AM    LDL, calculated 49.6 09/09/2022 01:40 AM    VLDL, calculated 17.4 09/09/2022 01:40 AM    Triglyceride 87 09/09/2022 01:40 AM    CHOL/HDL Ratio 4.2 09/09/2022 01:40 AM      BNP No results for input(s): BNPP in the last 72 hours.    Liver Enzymes Recent Labs     09/11/22  0245   TP 7.1   ALB 2.8*   *        Thyroid Studies No results found for: T4, T3U, TSH, TSHEXT, TSHEXT     Procedures/imaging: see electronic medical records for all procedures/Xrays and details which were not copied into this note but were reviewed prior to creation of Rick Plummer MD

## 2022-09-12 NOTE — PROGRESS NOTES
Bedside and Verbal shift change report given to SANDRA Weldon RN (oncoming nurse) by WILDA Nieves RN (offgoing nurse). Report included the following information SBAR, Kardex, Intake/Output, MAR, and Recent Results.

## 2022-09-12 NOTE — PROGRESS NOTES
Physician Progress Note      Anna Aburto  CSN #:                  [id number]  :                       1955  ADMIT DATE:       2022 3:54 PM  [address] [place] DATE:  RESPONDING  PROVIDER #:        Siddhartha Badillo MD          QUERY TEXT:    Pt admitted with CHF. If possible, please document in progress notes and discharge summary further specificity regarding the type and acuity of CHF:    The medical record reflects the following:  Risk Factors: HTN, chronic alcohol use, COPD, anemia    Clinical Indicators:  2022, PN, Dr. Chucho Christy CHF exacerbation with elevated troponin. \"  2022, PN, Dr. Katia Agee:  \"Minimal troponin elevation without ACS  Technical qualifiers: Echo study was limited due to patient tolerance. Left? Ventricle: Normal left ventricular systolic function with a visually estimated EF of 55 - 60%. Left ventricle is mildly dilated. Normal wall thickness. Normal wall motion. Grade I diastolic dysfunction present. Pulmonary? Arteries: Mild pulmonary hypertension present. The estimated PASP is 44 mmHg. Continue with aspirin, Lasix and carvedilol. \"    Treatment: ASA, Lasix, carvedilol    Thank you,  ALEX Robbins RN, CDS  [email or web address]  Options provided:  -- Acute on Chronic Systolic CHF/HFrEF  -- Acute on Chronic Diastolic CHF/HFpEF  -- Acute on Chronic Systolic and Diastolic CHF  -- Other - I will add my own diagnosis  -- Disagree - Not applicable / Not valid  -- Disagree - Clinically unable to determine / Unknown  -- Refer to Clinical Documentation Reviewer    PROVIDER RESPONSE TEXT:    This patient is in acute on chronic diastolic CHF/HFpEF. Query created by:  Osiel Osborne on 2022 11:31 AM      Electronically signed by:  Siddhartha Badillo MD 2022 11:33 AM

## 2022-09-12 NOTE — PROGRESS NOTES
D/C Plan: Discharge home with [place] versus home with family depending upon clinical progression    CM met with patient at bedside. Patient states she is independent in ADLs: \"it takes me a while but I can do them. \" CM asked patient if she has ever been screened for Medicaid as Medicaid can provide assistance in the home. Patient stated she was not interested in getting screened by Medicaid at this time. Patient stated she wants \"to think about it\" when CM offered HH. Patient states her walker at home does not have wheels and stated that it is hard for her to use. CM notes patient does not have therapy ordered, physician notified. Care Management Interventions  PCP Verified by CM:  Yes  Transition of Care Consult (CM Consult): Discharge Planning  Support Systems: Child(reji)  Confirm Follow Up Transport: Family  The Plan for Transition of Care is Related to the Following Treatment Goals : home with [place] versus home with family assistance  Discharge Location  Patient Expects to be Discharged to[de-identified] Home with home health

## 2022-09-12 NOTE — PROGRESS NOTES
Problem: Falls - Risk of  Goal: *Absence of Falls  Description: Document Earma Dante Fall Risk and appropriate interventions in the flowsheet.   Outcome: Progressing Towards Goal  Note: Fall Risk Interventions:  Mobility Interventions: Assess mobility with egress test, Communicate number of staff needed for ambulation/transfer, OT consult for ADLs, Patient to call before getting OOB, PT Consult for mobility concerns, PT Consult for assist device competence, Strengthening exercises (ROM-active/passive), Utilize walker, cane, or other assistive device    Mentation Interventions: Adequate sleep, hydration, pain control, Toileting rounds, Update white board    Medication Interventions: Evaluate medications/consider consulting pharmacy, Patient to call before getting OOB, Teach patient to arise slowly    Elimination Interventions: Call light in reach, Patient to call for help with toileting needs, Stay With Me (per policy), Toilet paper/wipes in reach, Toileting schedule/hourly rounds              Problem: Patient Education: Go to Patient Education Activity  Goal: Patient/Family Education  Outcome: Progressing Towards Goal     Problem: Heart Failure: Day 1  Goal: Off Pathway (Use only if patient is Off Pathway)  Outcome: Progressing Towards Goal  Goal: Activity/Safety  Outcome: Progressing Towards Goal  Goal: Consults, if ordered  Outcome: Progressing Towards Goal  Goal: Diagnostic Test/Procedures  Outcome: Progressing Towards Goal  Goal: Nutrition/Diet  Outcome: Progressing Towards Goal  Goal: Discharge Planning  Outcome: Progressing Towards Goal  Goal: Medications  Outcome: Progressing Towards Goal  Goal: Respiratory  Outcome: Progressing Towards Goal  Goal: Treatments/Interventions/Procedures  Outcome: Progressing Towards Goal  Goal: Psychosocial  Outcome: Progressing Towards Goal  Goal: *Oxygen saturation within defined limits  Outcome: Progressing Towards Goal  Goal: *Hemodynamically stable  Outcome: Progressing Towards Goal  Goal: *Optimal pain control at patient's stated goal  Outcome: Progressing Towards Goal  Goal: *Anxiety reduced or absent  Outcome: Progressing Towards Goal     Problem: Heart Failure: Day 2  Goal: Off Pathway (Use only if patient is Off Pathway)  Outcome: Progressing Towards Goal  Goal: Activity/Safety  Outcome: Progressing Towards Goal  Goal: Consults, if ordered  Outcome: Progressing Towards Goal  Goal: Diagnostic Test/Procedures  Outcome: Progressing Towards Goal  Goal: Nutrition/Diet  Outcome: Progressing Towards Goal  Goal: Discharge Planning  Outcome: Progressing Towards Goal  Goal: Medications  Outcome: Progressing Towards Goal  Goal: Respiratory  Outcome: Progressing Towards Goal  Goal: Treatments/Interventions/Procedures  Outcome: Progressing Towards Goal  Goal: Psychosocial  Outcome: Progressing Towards Goal  Goal: *Oxygen saturation within defined limits  Outcome: Progressing Towards Goal  Goal: *Hemodynamically stable  Outcome: Progressing Towards Goal  Goal: *Optimal pain control at patient's stated goal  Outcome: Progressing Towards Goal  Goal: *Anxiety reduced or absent  Outcome: Progressing Towards Goal  Goal: *Demonstrates progressive activity  Outcome: Progressing Towards Goal     Problem: Heart Failure: Day 3  Goal: Off Pathway (Use only if patient is Off Pathway)  Outcome: Progressing Towards Goal  Goal: Activity/Safety  Outcome: Progressing Towards Goal  Goal: Diagnostic Test/Procedures  Outcome: Progressing Towards Goal  Goal: Nutrition/Diet  Outcome: Progressing Towards Goal  Goal: Discharge Planning  Outcome: Progressing Towards Goal  Goal: Medications  Outcome: Progressing Towards Goal  Goal: Respiratory  Outcome: Progressing Towards Goal  Goal: Treatments/Interventions/Procedures  Outcome: Progressing Towards Goal  Goal: Psychosocial  Outcome: Progressing Towards Goal  Goal: *Oxygen saturation within defined limits  Outcome: Progressing Towards Goal  Goal: *Hemodynamically stable  Outcome: Progressing Towards Goal  Goal: *Optimal pain control at patient's stated goal  Outcome: Progressing Towards Goal  Goal: *Anxiety reduced or absent  Outcome: Progressing Towards Goal  Goal: *Demonstrates progressive activity  Outcome: Progressing Towards Goal     Problem: Heart Failure: Day 4  Goal: Off Pathway (Use only if patient is Off Pathway)  Outcome: Progressing Towards Goal  Goal: Activity/Safety  Outcome: Progressing Towards Goal  Goal: Diagnostic Test/Procedures  Outcome: Progressing Towards Goal  Goal: Nutrition/Diet  Outcome: Progressing Towards Goal  Goal: Discharge Planning  Outcome: Progressing Towards Goal  Goal: Medications  Outcome: Progressing Towards Goal  Goal: Respiratory  Outcome: Progressing Towards Goal  Goal: Treatments/Interventions/Procedures  Outcome: Progressing Towards Goal  Goal: Psychosocial  Outcome: Progressing Towards Goal  Goal: *Oxygen saturation within defined limits  Outcome: Progressing Towards Goal  Goal: *Hemodynamically stable  Outcome: Progressing Towards Goal  Goal: *Optimal pain control at patient's stated goal  Outcome: Progressing Towards Goal  Goal: *Anxiety reduced or absent  Outcome: Progressing Towards Goal  Goal: *Demonstrates progressive activity  Outcome: Progressing Towards Goal     Problem: Heart Failure: Day 5  Goal: Off Pathway (Use only if patient is Off Pathway)  Outcome: Progressing Towards Goal  Goal: Activity/Safety  Outcome: Progressing Towards Goal  Goal: Diagnostic Test/Procedures  Outcome: Progressing Towards Goal  Goal: Nutrition/Diet  Outcome: Progressing Towards Goal  Goal: Discharge Planning  Outcome: Progressing Towards Goal  Goal: Medications  Outcome: Progressing Towards Goal  Goal: Respiratory  Outcome: Progressing Towards Goal  Goal: Treatments/Interventions/Procedures  Outcome: Progressing Towards Goal  Goal: Psychosocial  Outcome: Progressing Towards Goal

## 2022-09-13 LAB
HBV SURFACE AG SER QL: <0.1 INDEX
HBV SURFACE AG SER QL: NEGATIVE
HCV AB SER IA-ACNC: 0.17 INDEX
HCV AB SERPL QL IA: NEGATIVE
HCV COMMENT,HCGAC: NORMAL

## 2022-09-13 NOTE — PROGRESS NOTES
Cardiology Progress Note        Patient: Lizbeth Helm        Sex: female          DOA: 9/8/2022  YOB: 1955      Age:  74 y.o.        LOS:  LOS: 4 days     Patient seen and examined, chart reviewed. Assessment/Plan     Patient Active Problem List   Diagnosis Code    CAD (coronary artery disease) I25.10    Memory impairment R41.3    COPD (chronic obstructive pulmonary disease) ([facility]) J44.9    Acute exacerbation of CHF (congestive heart failure) ([facility]) I50.9    Leukocytosis D72.829    Alcohol use Z72.89    Smoker F17.200    Poor historian Z78.9    Hepatic steatosis K76.0    Noncompliance with medications Z91.14    Elevated troponin R77.8    Hypoalbuminemia due to protein-calorie malnutrition ([facility]) E88.09, E46    Anemia D64.9      Abnormal troponin:  no clear evidence of acute coronary syndrome could be demand ischemia      Echocardiogram revealed       Technical qualifiers: Echo study was limited due to patient tolerance. Left Ventricle: Normal left ventricular systolic function with a visually estimated EF of 55 - 60%. Left ventricle is mildly dilated. Normal wall thickness. Normal wall motion. Grade I diastolic dysfunction present. Pulmonary Arteries: Mild pulmonary hypertension present. The estimated PASP is 44 mmHg. Plan:     Continue aspirin and carvedilol. Continue Lasix. Monitor and replace electrolyte as per  hospitalist  Monitor renal function and electrolytes. Cardiology signed off. Plan discussed with Dr.Teule Luis  Follow up in cardiology clinic in 6 weeks after discharge               Subjective:    cc: My breathing is okay, leg swelling improving      REVIEW OF SYSTEMS:     General: No fevers or chills. Cardiovascular: No chest pain,No palpitations, No orthopnea, No PND,  positive leg swelling, No claudication  Pulmonary: No  dyspnea.    Gastrointestinal: No nausea, vomiting, bleeding  Neurology: No Dizziness    Objective:      Visit Vitals  BP (!) 122/59   Pulse 82   Temp 98.3 °F (36.8 °C)   Resp 18   Ht 5' 1\" (1.549 m)   Wt 57.9 kg (127 lb 9.6 oz)   SpO2 100%   BMI 24.11 kg/m²     Body mass index is 24.11 kg/m². Physical Exam:  General Appearance: Comfortable, not using accessory muscles of respiration. HEENT: MEGAN. HEAD: Atraumatic  NECK: No JVD, no thyroidomeglay. CAROTIDS: no bruit  LUNGS: Clear bilaterally. HEART: S1+S2 audible, no murmur, no pericardial rub. ABD: Non-tender, BS Audible    EXT:  bilateral lower extremity trace edema, and no cyanosis. VASCULAR EXAM: Pulses are intact. PSYCHIATRIC EXAM: Mood is appropriate. MUSCULOSKELETAL: Grossly no joint deformity.   NEUROLOGICAL: AAO times 3, No motor and sensory deficit    Medication:  Current Facility-Administered Medications   Medication Dose Route Frequency    pantoprazole (PROTONIX) tablet 40 mg  40 mg Oral BID    sertraline (ZOLOFT) tablet 25 mg  25 mg Oral DAILY    carvediloL (COREG) tablet 3.125 mg  3.125 mg Oral BID WITH MEALS    aspirin chewable tablet 81 mg  81 mg Oral DAILY    furosemide (LASIX) injection 20 mg  20 mg IntraVENous BID    albumin human 25% (BUMINATE) solution 25 g  25 g IntraVENous Q6H    ipratropium (ATROVENT) 0.02 % nebulizer solution 0.5 mg  0.5 mg Nebulization BID RT    acetaminophen (TYLENOL) tablet 650 mg  650 mg Oral Q6H PRN    Or    acetaminophen (TYLENOL) suppository 650 mg  650 mg Rectal Q6H PRN    polyethylene glycol (MIRALAX) packet 17 g  17 g Oral DAILY PRN    ondansetron (ZOFRAN ODT) tablet 4 mg  4 mg Oral Q8H PRN    Or    ondansetron (ZOFRAN) injection 4 mg  4 mg IntraVENous Q6H PRN    enoxaparin (LOVENOX) injection 40 mg  40 mg SubCUTAneous DAILY    sodium chloride (NS) flush 5-40 mL  5-40 mL IntraVENous Q8H    sodium chloride (NS) flush 5-40 mL  5-40 mL IntraVENous PRN    LORazepam (ATIVAN) tablet 1 mg  1 mg Oral Q1H PRN    Or    LORazepam (ATIVAN) 2 mg/mL injection 1 mg  1 mg IntraVENous Q1H PRN    LORazepam (ATIVAN) tablet 2 mg  2 mg Oral Q1H PRN    Or    LORazepam (ATIVAN) 2 mg/mL injection 2 mg  2 mg IntraVENous Q1H PRN    LORazepam (ATIVAN) 2 mg/mL injection 3 mg  3 mg IntraVENous Q15MIN PRN     Current Outpatient Medications   Medication Sig    [START ON 9/13/2022] aspirin 81 mg chewable tablet Take 1 Tablet by mouth daily. [START ON 9/13/2022] carvediloL (COREG) 3.125 mg tablet Take 1 Tablet by mouth two (2) times daily (with meals). furosemide (Lasix) 20 mg tablet Take 1 Tablet by mouth two (2) times a day. [START ON 9/13/2022] sertraline (ZOLOFT) 25 mg tablet Take 1 Tablet by mouth daily. potassium chloride (K-DUR, KLOR-CON M20) 20 mEq tablet Take 1 Tablet by mouth daily. pantoprazole (PROTONIX) 40 mg tablet Take 40 mg by mouth two (2) times a day. tiotropium (Spiriva with HandiHaler) 18 mcg inhalation capsule Take 1 Capsule by inhalation daily. albuterol (PROVENTIL HFA, VENTOLIN HFA, PROAIR HFA) 90 mcg/actuation inhaler Take  by inhalation.  Indications: chronic obstructive pulmonary disease               Lab/Data Reviewed:       Recent Labs     09/11/22  0245 09/10/22  0257   WBC 12.7 13.0   HGB 8.8* 8.8*   HCT 25.5* 25.4*    148     Recent Labs     09/12/22  1807 09/11/22  0245 09/10/22  0257   * 138 140   K 3.5 3.0* 3.2*    107 109   CO2 23 26 27   GLU 80 72* 84   BUN 6* 5* 6*   CREA 0.73 0.60 0.66   CA 9.1 8.2* 7.9*       Signed By: Vince Hampton MD     September 12, 2022

## 2022-09-13 NOTE — DISCHARGE SUMMARY
Discharge Summary    Patient: Syeda Fowler MRN: [id number]  CSN: [id number]    YOB: 1955  Age: 74 y.o. Sex: female    DOA: 9/8/2022 LOS:  LOS: 4 days   Discharge Date:      Primary Care Provider:  John Canela PA-C    Admission Diagnoses: Elevated troponin [R77.8]    Discharge Diagnoses:    Problem List as of 9/12/2022 Date Reviewed: 9/8/2022            Codes Class Noted - Resolved    Poor historian ICD-10-CM: Z78.9  ICD-9-CM: V49.89  9/9/2022 - Present        Hepatic steatosis ICD-10-CM: K76.0  ICD-9-CM: 571.8  9/9/2022 - Present        Noncompliance with medications ICD-10-CM: Z91.14  ICD-9-CM: V15.81  9/9/2022 - Present        Elevated troponin ICD-10-CM: R77.8  ICD-9-CM: 790.6  9/9/2022 - Present        Hypoalbuminemia due to protein-calorie malnutrition ([facility] ICD-10-CM: E88.09, E46  ICD-9-CM: 273.8, 263.9  9/9/2022 - Present        Anemia ICD-10-CM: D64.9  ICD-9-CM: 285.9  9/9/2022 - Present        CAD (coronary artery disease) ICD-10-CM: I25.10  ICD-9-CM: 414.00  9/8/2022 - Present        Memory impairment ICD-10-CM: R41.3  ICD-9-CM: 780.93  9/8/2022 - Present        COPD (chronic obstructive pulmonary disease) ([facility] ICD-10-CM: J44.9  ICD-9-CM: 684  9/8/2022 - Present        * (Principal) Acute exacerbation of CHF (congestive heart failure) ([facility] ICD-10-CM: I50.9  ICD-9-CM: 428.0  9/8/2022 - Present        Leukocytosis ICD-10-CM: D72.829  ICD-9-CM: 288.60  9/8/2022 - Present        Alcohol use ICD-10-CM: Z72.89  ICD-9-CM: V49.89  9/8/2022 - Present        Smoker ICD-10-CM: F17.200  ICD-9-CM: 305.1  9/8/2022 - Present           Discharge Medications:     Current Discharge Medication List        START taking these medications    Details   aspirin 81 mg chewable tablet Take 1 Tablet by mouth daily. Qty: 30 Tablet, Refills: 0  Start date: 9/13/2022      carvediloL (COREG) 3.125 mg tablet Take 1 Tablet by mouth two (2) times daily (with meals).   Qty: 60 Tablet, Refills: 0  Start date: 9/13/2022      furosemide (Lasix) 20 mg tablet Take 1 Tablet by mouth two (2) times a day. Qty: 60 Tablet, Refills: 0  Start date: 9/12/2022      sertraline (ZOLOFT) 25 mg tablet Take 1 Tablet by mouth daily. Qty: 30 Tablet, Refills: 0  Start date: 9/13/2022      potassium chloride (K-DUR, KLOR-CON M20) 20 mEq tablet Take 1 Tablet by mouth daily. Qty: 30 Tablet, Refills: 0  Start date: 9/12/2022           CONTINUE these medications which have NOT CHANGED    Details   pantoprazole (PROTONIX) 40 mg tablet Take 40 mg by mouth two (2) times a day. tiotropium (Spiriva with HandiHaler) 18 mcg inhalation capsule Take 1 Capsule by inhalation daily. albuterol (PROVENTIL HFA, VENTOLIN HFA, PROAIR HFA) 90 mcg/actuation inhaler Take  by inhalation. Indications: chronic obstructive pulmonary disease             Discharge Condition: Stable    Procedures : none    Consults: Cardiology and Hematology/Oncology      PHYSICAL EXAM   Visit Vitals  BP (!) 122/59   Pulse 82   Temp 98.3 °F (36.8 °C)   Resp 18   Ht 5' 1\" (1.549 m)   Wt 57.9 kg (127 lb 9.6 oz)   SpO2 100%   BMI 24.11 kg/m²     General: Awake, cooperative, no acute distress    HEENT: NC, Atraumatic. PERRLA, EOMI. Anicteric sclerae. Lungs:  CTA Bilaterally. No Wheezing/Rhonchi/Rales. Heart:  Regular  rhythm,  No murmur, No Rubs, No Gallops  Abdomen: Soft, Non distended, Non tender. +Bowel sounds,   Extremities: No c/c/e  Psych:   Not anxious or agitated. Neurologic:  No acute neurological deficits. Admission HPI : Meenakshi Martinez is a 74 y.o. female with CAD, COPD, CHF, chronic leukocytosis, chronic alcohol use, memory impairment, and tobacco use presents to the ED for leg swelling, abdominal swelling, shortness of breath, chest pain, and fatigue worsening over the past several months. Her legs and abdomen have swelled especially in the last few weeks. She has had some shortness of breath with chest pain for months as well.  She was prescribed lasix but stopped taking it and hasn't followed up with cardiology (unsure how long ago). She lives with her daughter. She drinks 3-4 beers per night to help her sleep for many years. She is a very poor historian and has trouble any answering questions with vague responses. Hospital Course : 42-year-old female with CAD, COPD, CHF, chronic leukocytosis, chronic alcohol use, memory impairment, and tobacco use is admitted with acute CHF exacerbation with bilateral lower extremity swelling, hypoalbuminemia due to protein-calorie malnutrition, and hepatic steatosis.      Acute CHF exacerbation with elevated troponin  Chronic leukocytosis with hepatosplenomegaly  Anemia   Elevated d-dimer   Hypoalbuminemia due to protein calorie malnutrition  Hepatic steatosis-suspect possible cirrhosis as well  Noncompliance with medications  Chronic longstanding alcohol use  COPD without exacerbation  Smoker  CAD  Cognitive impairment-chronic, lives with her daughter     Appreciate hematology following -anemia stable, work up so far negative for iron B12 deficiency and hemolysis, leukocytosis resolved, possibility of hyperglobulinemia is still under consideration and SPEP is pending     CHF exacerbation -resolved, continue aspirin, carvedilol and lasix      Hypokalemia resolved     Low albumin improved      Palliative care consult gave overall poor prognosis    Activity: Activity as tolerated    Diet: Cardiac Diet    Follow-up: Follow up in cardiology clinic in 6 weeks after discharge     Disposition: Home     Minutes spent on discharge: 45 minutes       Labs: Results:       Chemistry Recent Labs     09/12/22  1807 09/11/22  0245 09/10/22  0257   GLU 80 72* 84   * 138 140   K 3.5 3.0* 3.2*    107 109   CO2 23 26 27   BUN 6* 5* 6*   CREA 0.73 0.60 0.66   CA 9.1 8.2* 7.9*   AGAP 8 5 4   BUCR 8* 8* 9*   AP  --  154* 210*   TP  --  7.1 6.9   ALB  --  2.8* 1.8*   GLOB  --  4.3* 5.1*   AGRAT  --  0.7* 0.4* CBC w/Diff Recent Labs     09/11/22  0245 09/10/22  0257   WBC 12.7 13.0   RBC 2.70* 2.68*   HGB 8.8* 8.8*   HCT 25.5* 25.4*    148      Cardiac Enzymes No results for input(s): CPK, CKND1, AURELIA in the last 72 hours. No lab exists for component: CKRMB, TROIP   Coagulation No results for input(s): PTP, INR, APTT, INREXT in the last 72 hours. Lipid Panel Lab Results   Component Value Date/Time    Cholesterol, total 88 09/09/2022 01:40 AM    HDL Cholesterol 21 (L) 09/09/2022 01:40 AM    LDL, calculated 49.6 09/09/2022 01:40 AM    VLDL, calculated 17.4 09/09/2022 01:40 AM    Triglyceride 87 09/09/2022 01:40 AM    CHOL/HDL Ratio 4.2 09/09/2022 01:40 AM      BNP No results for input(s): BNPP in the last 72 hours. Liver Enzymes Recent Labs     09/11/22  0245   TP 7.1   ALB 2.8*   *      Thyroid Studies No results found for: T4, T3U, TSH, TSHEXT         Significant Diagnostic Studies: CTA CHEST W OR W WO CONT    Result Date: 9/8/2022  EXAM: CTA CHEST W OR W WO CONT CLINICAL INDICATION/HISTORY: BLE swelling, intermittent CP and SOB, elevated d dimer -Additional: None COMPARISON: None TECHNIQUE: Axial CT imaging from the thoracic inlet through the diaphragm with intravenous contrast. Coronal and sagittal MIP reformats were generated. One or more dose reduction techniques were used on this CT: automated exposure control, adjustment of the mAs and/or kVp according to patient size, and iterative reconstruction techniques. The specific techniques used on this CT exam have been documented in the patient's electronic medical record. Digital Imaging and Communications in Medicine (DICOM) format image data are available to nonaffiliated external healthcare facilities or entities on a secure, media free, reciprocally searchable basis with patient authorization for at least a 12-month period after this study.  _______________ FINDINGS: EXAM QUALITY: Adequate bolus timing with mild respiratory motion artifact degradation. PULMONARY ARTERIES: No central, interlobar or visualized segmental branch pulmonary arterial embolus. Normal caliber main pulmonary artery. MEDIASTINUM: Cardiomegaly without pericardial effusion. Trivial atherosclerotic calcifications of the transverse aortic arch. LUNGS: Multifocal bilateral lower lobe and right middle lobe bandlike and linear atelectasis. No consolidation. Blanchie Bevels PLEURA: No pneumothorax or effusion. AIRWAY: Normal. LYMPH NODES: Nonspecific Right hilar 1.6 cm enlarged lymph node. UPPER ABDOMEN: Small volume upper abdominal ascites and mesenteric fluid. Prior cholecystectomy. Diffusely heterogeneous enhancing appearance of the liver which is likely secondary to bolus timing artifact. . OTHER: No acute or aggressive osseous abnormalities identified. _______________     1. No evidence of pulmonary embolism. 2.  Cardiomegaly with Multifocal bilateral lower lobe and right middle lobe bandlike and linear atelectasis. Asymmetric enlarged right hilar lymph node, potentially reactive. 3. Nonspecific small volume of abdominal ascites. CT ABD PELV WO CONT    Result Date: 9/8/2022  EXAM: CT ABD PELV WO CONT CLINICAL INDICATION/HISTORY: leukocytosis and elevated bilirubin, ascites seen on CTA chest -Additional: None COMPARISON: CTA chest from same day TECHNIQUE: Axial CT imaging of the abdomen and pelvis was performed without intravenous contrast. Multiplanar reformats were generated. One or more dose reduction techniques were used on this CT: automated exposure control, adjustment of the mAs and/or kVp according to patient size, and iterative reconstruction techniques. The specific techniques used on this CT exam have been documented in the patient's electronic medical record.  Digital Imaging and Communications in Medicine (DICOM) format image data are available to nonaffiliated external healthcare facilities or entities on a secure, media free, reciprocally searchable basis with patient authorization for at least a 12-month period after this study. _______________ FINDINGS: LOWER CHEST: Multifocal bibasilar bandlike and linear atelectasis. LIVER, BILIARY: Mild hepatomegaly with diffuse heterogeneous attenuation. No biliary dilation. Prior cholecystectomy. PANCREAS: Unremarkable SPLEEN: Splenomegaly measuring 14.6 cm in maximal dimension. ADRENALS: Normal. KIDNEYS, URETERS, BLADDER: Symmetric excretion of intravenous contrast from preceding same day CT chest examination. No findings of hydronephrosis. Unremarkable bladder PELVIC ORGANS: Unremarkable. GASTROINTESTINAL TRACT: Degraded evaluation secondary absence of contrast. No bowel dilation or wall thickening. Colonic diverticulosis, no CT evidence of acute diverticulitis. LYMPH NODES: No enlarged lymph nodes. VASCULATURE: Atherosclerotic calcification without aneurysm. OSSEOUS: No acute or aggressive osseous abnormalities identified. OTHER: Small volume abdominal and pelvic ascites with mild diffuse soft tissue anasarca. . _______________     1. Hepatosplenomegaly with findings suggestive of steatosis. Nonspecific small volume abdominal pelvic ascites and soft tissue anasarca. Diagnostic considerations may represent sequela of chronic liver disease, anemia/hypoproteinemia. US ABD LTD    Result Date: 9/9/2022  EXAM: Limited right upper quadrant abdominal ultrasound INDICATION: Suspected cirrhosis. COMPARISON: 9/8/2022. TECHNIQUE: Real-time abdomen/right upper quadrant sonography in multiple planes was performed with image documentation. Grayscale, color flow Doppler imaging, and velocity spectral waveform analysis of the portal vein was performed (duplex imaging). _______________ FINDINGS: LIVER: Heterogeneous/increased increased echogenicity. Mild hepatic nodularity. No focal mass Color flow Doppler and velocity spectral waveform analysis of the portal vein shows normal (hepatopetal) direction of flow. Cristiano Salter  BILIARY SYSTEM: No intrahepatic biliary dilatation. Common bile duct is normal in caliber measuring 0.7 cm. GALLBLADDER: Cholecystectomy. RIGHT KIDNEY: 10.4 cm in length. No hydronephrosis or renal mass. No visible calculi. PANCREAS: Head and body are unremarkable in appearance though the tail is obscured by overlying bowel gas. IVC: Visualized portions are unremarkable in appearance. OTHER: Trace perihepatic ascites. _______________     Cirrhosis. No focal hepatic lesion. Small perihepatic ascites. XR CHEST PORT    Result Date: 9/8/2022  EXAM: XR CHEST PORT CLINICAL INDICATION/HISTORY: CP, SOB, BLE edema -Additional: None COMPARISON: None TECHNIQUE: Frontal view of the chest _______________ FINDINGS: HEART AND MEDIASTINUM: Midline cardiac silhouette, normal in size. Unremarkable hilar vascular structures. LUNGS AND PLEURAL SPACES: Bibasilar discoid interstitial opacities with mild diffuse interstitial prominence. No pneumothorax or effusion. BONY THORAX AND SOFT TISSUES: No acute or destructive osseous abnormality. _______________     1. Bibasilar atelectasis and/or scarring, with nonspecific interstitial prominence. Differential considerations may include early interstitial edema versus chronic versus reactive small airways process. ECHO ADULT COMPLETE    Result Date: 9/9/2022  Formatting of this result is different from the original.   Technical qualifiers: Echo study was limited due to patient tolerance. Left Ventricle: Normal left ventricular systolic function with a visually estimated EF of 55 - 60%. Left ventricle is mildly dilated. Normal wall thickness. Normal wall motion. Grade I diastolic dysfunction present. Pulmonary Arteries: Mild pulmonary hypertension present. The estimated PASP is 44 mmHg. DUPLEX LOWER EXT VENOUS BILAT    Result Date: 9/8/2022  · No evidence of deep vein thrombosis in the right lower extremity. · No evidence of deep vein thrombosis in the left lower extremity.           No results found for this or any previous visit.         CC: Ne Little PA-C

## 2022-09-14 LAB
ALBUMIN SERPL ELPH-MCNC: 2.5 G/DL (ref 2.9–4.4)
ALBUMIN/GLOB SERPL: 0.6 {RATIO} (ref 0.7–1.7)
ALPHA1 GLOB SERPL ELPH-MCNC: 0.2 G/DL (ref 0–0.4)
ALPHA2 GLOB SERPL ELPH-MCNC: 0.4 G/DL (ref 0.4–1)
B-GLOBULIN SERPL ELPH-MCNC: 1.1 G/DL (ref 0.7–1.3)
GAMMA GLOB SERPL ELPH-MCNC: 3 G/DL (ref 0.4–1.8)
GLOBULIN SER-MCNC: 4.6 G/DL (ref 2.2–3.9)
HBV CORE AB SERPL QL IA: NEGATIVE
IGA SERPL-MCNC: 1281 MG/DL (ref 87–352)
IGG SERPL-MCNC: 3080 MG/DL (ref 586–1602)
IGM SERPL-MCNC: 188 MG/DL (ref 26–217)
INTERPRETATION SERPL IEP-IMP: ABNORMAL
M PROTEIN SERPL ELPH-MCNC: ABNORMAL G/DL
PERIPHERAL SMEAR,PSM: NORMAL
PROT SERPL-MCNC: 7.1 G/DL (ref 6–8.5)

## 2022-09-15 LAB — COPPER SERPL-MCNC: 65 UG/DL (ref 80–158)

## 2022-11-19 ENCOUNTER — APPOINTMENT (OUTPATIENT)
Dept: GENERAL RADIOLOGY | Age: 67
End: 2022-11-19
Attending: STUDENT IN AN ORGANIZED HEALTH CARE EDUCATION/TRAINING PROGRAM
Payer: MEDICARE

## 2022-11-19 ENCOUNTER — HOSPITAL ENCOUNTER (EMERGENCY)
Age: 67
Discharge: HOME | End: 2022-11-19
Attending: STUDENT IN AN ORGANIZED HEALTH CARE EDUCATION/TRAINING PROGRAM
Payer: MEDICARE

## 2022-11-19 VITALS
SYSTOLIC BLOOD PRESSURE: 105 MMHG | OXYGEN SATURATION: 97 % | TEMPERATURE: 98.5 F | HEIGHT: 61 IN | DIASTOLIC BLOOD PRESSURE: 50 MMHG | BODY MASS INDEX: 20.01 KG/M2 | RESPIRATION RATE: 20 BRPM | HEART RATE: 90 BPM | WEIGHT: 106 LBS

## 2022-11-19 DIAGNOSIS — J18.9 COMMUNITY ACQUIRED PNEUMONIA OF LEFT LUNG, UNSPECIFIED PART OF LUNG: ICD-10-CM

## 2022-11-19 DIAGNOSIS — E87.6 HYPOKALEMIA: ICD-10-CM

## 2022-11-19 DIAGNOSIS — J10.1 INFLUENZA A: Primary | ICD-10-CM

## 2022-11-19 LAB
ALBUMIN SERPL-MCNC: 2.2 G/DL (ref 3.4–5)
ALBUMIN/GLOB SERPL: 0.3 {RATIO} (ref 0.8–1.7)
ALP SERPL-CCNC: 138 U/L (ref 45–117)
ALT SERPL-CCNC: 21 U/L (ref 13–56)
ANION GAP SERPL CALC-SCNC: 5 MMOL/L (ref 3–18)
AST SERPL-CCNC: 46 U/L (ref 10–38)
BASOPHILS # BLD: 0 K/UL (ref 0–0.1)
BASOPHILS NFR BLD: 0 % (ref 0–2)
BILIRUB SERPL-MCNC: 4.7 MG/DL (ref 0.2–1)
BUN SERPL-MCNC: 10 MG/DL (ref 7–18)
BUN/CREAT SERPL: 17 (ref 12–20)
CALCIUM SERPL-MCNC: 8.6 MG/DL (ref 8.5–10.1)
CHLORIDE SERPL-SCNC: 97 MMOL/L (ref 100–111)
CO2 SERPL-SCNC: 28 MMOL/L (ref 21–32)
CREAT SERPL-MCNC: 0.6 MG/DL (ref 0.6–1.3)
DIFFERENTIAL METHOD BLD: ABNORMAL
EOSINOPHIL # BLD: 0.1 K/UL (ref 0–0.4)
EOSINOPHIL NFR BLD: 1 % (ref 0–5)
ERYTHROCYTE [DISTWIDTH] IN BLOOD BY AUTOMATED COUNT: 12.5 % (ref 11.6–14.5)
FLUAV RNA SPEC QL NAA+PROBE: DETECTED
FLUBV RNA SPEC QL NAA+PROBE: NOT DETECTED
GLOBULIN SER CALC-MCNC: 6.6 G/DL (ref 2–4)
GLUCOSE SERPL-MCNC: 87 MG/DL (ref 74–99)
HCT VFR BLD AUTO: 31 % (ref 35–45)
HGB BLD-MCNC: 10.9 G/DL (ref 12–16)
IMM GRANULOCYTES # BLD AUTO: 0 K/UL
IMM GRANULOCYTES NFR BLD AUTO: 0 %
LIPASE SERPL-CCNC: 78 U/L (ref 73–393)
LYMPHOCYTES # BLD: 0.9 K/UL (ref 0.9–3.6)
LYMPHOCYTES NFR BLD: 8 % (ref 21–52)
MCH RBC QN AUTO: 32.8 PG (ref 24–34)
MCHC RBC AUTO-ENTMCNC: 35.2 G/DL (ref 31–37)
MCV RBC AUTO: 93.4 FL (ref 78–100)
MONOCYTES # BLD: 1.2 K/UL (ref 0.05–1.2)
MONOCYTES NFR BLD: 11 % (ref 3–10)
NEUTS SEG # BLD: 8.8 K/UL (ref 1.8–8)
NEUTS SEG NFR BLD: 80 % (ref 40–73)
NRBC # BLD: 0 K/UL (ref 0–0.01)
NRBC BLD-RTO: 0 PER 100 WBC
PLATELET # BLD AUTO: 221 K/UL (ref 135–420)
PLATELET COMMENTS,PCOM: ABNORMAL
PMV BLD AUTO: 12.2 FL (ref 9.2–11.8)
POTASSIUM SERPL-SCNC: 3.2 MMOL/L (ref 3.5–5.5)
PROT SERPL-MCNC: 8.8 G/DL (ref 6.4–8.2)
RBC # BLD AUTO: 3.32 M/UL (ref 4.2–5.3)
RBC MORPH BLD: ABNORMAL
SARS-COV-2, COV2: NOT DETECTED
SODIUM SERPL-SCNC: 130 MMOL/L (ref 136–145)
TROPONIN-HIGH SENSITIVITY: 86 NG/L (ref 0–54)
TROPONIN-HIGH SENSITIVITY: 91 NG/L (ref 0–54)
WBC # BLD AUTO: 11 K/UL (ref 4.6–13.2)

## 2022-11-19 PROCEDURE — 80053 COMPREHEN METABOLIC PANEL: CPT

## 2022-11-19 PROCEDURE — 71046 X-RAY EXAM CHEST 2 VIEWS: CPT

## 2022-11-19 PROCEDURE — 87636 SARSCOV2 & INF A&B AMP PRB: CPT

## 2022-11-19 PROCEDURE — 99285 EMERGENCY DEPT VISIT HI MDM: CPT

## 2022-11-19 PROCEDURE — 96366 THER/PROPH/DIAG IV INF ADDON: CPT

## 2022-11-19 PROCEDURE — 93005 ELECTROCARDIOGRAM TRACING: CPT

## 2022-11-19 PROCEDURE — 74011000250 HC RX REV CODE- 250: Performed by: STUDENT IN AN ORGANIZED HEALTH CARE EDUCATION/TRAINING PROGRAM

## 2022-11-19 PROCEDURE — 74011250637 HC RX REV CODE- 250/637: Performed by: STUDENT IN AN ORGANIZED HEALTH CARE EDUCATION/TRAINING PROGRAM

## 2022-11-19 PROCEDURE — 74011250636 HC RX REV CODE- 250/636: Performed by: STUDENT IN AN ORGANIZED HEALTH CARE EDUCATION/TRAINING PROGRAM

## 2022-11-19 PROCEDURE — 83690 ASSAY OF LIPASE: CPT

## 2022-11-19 PROCEDURE — 94640 AIRWAY INHALATION TREATMENT: CPT

## 2022-11-19 PROCEDURE — 96365 THER/PROPH/DIAG IV INF INIT: CPT

## 2022-11-19 PROCEDURE — 85025 COMPLETE CBC W/AUTO DIFF WBC: CPT

## 2022-11-19 PROCEDURE — 96375 TX/PRO/DX INJ NEW DRUG ADDON: CPT

## 2022-11-19 PROCEDURE — 84484 ASSAY OF TROPONIN QUANT: CPT

## 2022-11-19 RX ORDER — IPRATROPIUM BROMIDE AND ALBUTEROL SULFATE 2.5; .5 MG/3ML; MG/3ML
3 SOLUTION RESPIRATORY (INHALATION)
Status: COMPLETED | OUTPATIENT
Start: 2022-11-19 | End: 2022-11-19

## 2022-11-19 RX ORDER — ONDANSETRON 2 MG/ML
4 INJECTION INTRAMUSCULAR; INTRAVENOUS ONCE
Status: COMPLETED | OUTPATIENT
Start: 2022-11-19 | End: 2022-11-19

## 2022-11-19 RX ORDER — ACETAMINOPHEN 500 MG
1000 TABLET ORAL ONCE
Status: COMPLETED | OUTPATIENT
Start: 2022-11-19 | End: 2022-11-19

## 2022-11-19 RX ORDER — METOCLOPRAMIDE HYDROCHLORIDE 5 MG/ML
10 INJECTION INTRAMUSCULAR; INTRAVENOUS
Status: COMPLETED | OUTPATIENT
Start: 2022-11-19 | End: 2022-11-19

## 2022-11-19 RX ORDER — CEFTRIAXONE 1 G/1
1 INJECTION, POWDER, FOR SOLUTION INTRAMUSCULAR; INTRAVENOUS
Status: COMPLETED | OUTPATIENT
Start: 2022-11-19 | End: 2022-11-19

## 2022-11-19 RX ORDER — AMOXICILLIN AND CLAVULANATE POTASSIUM 875; 125 MG/1; MG/1
1 TABLET, FILM COATED ORAL 2 TIMES DAILY
Qty: 14 TABLET | Refills: 0 | Status: SHIPPED | OUTPATIENT
Start: 2022-11-19 | End: 2022-11-20 | Stop reason: SDUPTHER

## 2022-11-19 RX ADMIN — POTASSIUM BICARBONATE 40 MEQ: 782 TABLET, EFFERVESCENT ORAL at 15:37

## 2022-11-19 RX ADMIN — IPRATROPIUM BROMIDE AND ALBUTEROL SULFATE 3 ML: .5; 3 SOLUTION RESPIRATORY (INHALATION) at 11:33

## 2022-11-19 RX ADMIN — CEFTRIAXONE 1 G: 1 INJECTION, POWDER, FOR SOLUTION INTRAMUSCULAR; INTRAVENOUS at 12:40

## 2022-11-19 RX ADMIN — METOCLOPRAMIDE 10 MG: 5 INJECTION, SOLUTION INTRAMUSCULAR; INTRAVENOUS at 13:34

## 2022-11-19 RX ADMIN — ACETAMINOPHEN 1000 MG: 500 TABLET ORAL at 11:33

## 2022-11-19 RX ADMIN — AZITHROMYCIN MONOHYDRATE 500 MG: 500 INJECTION, POWDER, LYOPHILIZED, FOR SOLUTION INTRAVENOUS at 12:43

## 2022-11-19 RX ADMIN — ONDANSETRON 4 MG: 2 INJECTION INTRAMUSCULAR; INTRAVENOUS at 12:51

## 2022-11-19 NOTE — DISCHARGE INSTRUCTIONS
Please take your medications as prescribed. Please follow-up with your primary care provider in 1 week to further work-up and manage today's complaint. Return to the ER immediately for any new or worsening symptoms such as worsening difficulty breathing, worsening chest pain, inability to tolerate any food or water, or any other concerning symptoms.

## 2022-11-19 NOTE — ED PROVIDER NOTES
EMERGENCY DEPARTMENT HISTORY AND PHYSICAL EXAM      Date: 11/19/2022  Patient Name: Terence Spatz      History of Presenting Illness     Chief Complaint   Patient presents with    Cough     Per patient, she has a Hx of COPD. She states \"I don't feel good\", she says she has been sick for two weeks. She states she has abdominal pain and no appetite. Location/Duration/Severity/Modifying factors   Terence Spatz is a 74 y.o. female with a PMH Hypertension, hyperlipidemia, diabetes, seizures, COPD, asthma presents to the ER for upper respiratory symptoms x2 weeks. Patient states that she started feeling ill approximately 1-1/2 to 2 weeks ago with cough, congestion, runny nose, body aches. Patient has had persistent nausea and vomiting for many weeks now but due to her acute illness, has not been able to eat or drink anything for the last few days. Patient also endorses shortness of breath with her coughing which has not improved with her inhaler. Not take any medications prior to arrival.  Has not been vaccinated for COVID and flu. No other medical complaints. Cough  The cough is Productive of sputum. There has been no fever. Associated symptoms include chills, headaches, rhinorrhea, sore throat, wheezing, nausea and vomiting. Pertinent negatives include no chest pain. The treatment provided no relief. There are no other complaints, changes, or physical findings at this time. PCP: Fe Lieberman PA-C    Current Outpatient Medications   Medication Sig Dispense Refill    amoxicillin-clavulanate (Augmentin) 875-125 mg per tablet Take 1 Tablet by mouth two (2) times a day for 7 days. 14 Tablet 0    aspirin 81 mg chewable tablet Take 1 Tablet by mouth daily. 30 Tablet 0    carvediloL (COREG) 3.125 mg tablet Take 1 Tablet by mouth two (2) times daily (with meals). 60 Tablet 0    furosemide (Lasix) 20 mg tablet Take 1 Tablet by mouth two (2) times a day.  60 Tablet 0    sertraline (ZOLOFT) 25 mg tablet Take 1 Tablet by mouth daily. 30 Tablet 0    potassium chloride (K-DUR, KLOR-CON M20) 20 mEq tablet Take 1 Tablet by mouth daily. 30 Tablet 0    pantoprazole (PROTONIX) 40 mg tablet Take 40 mg by mouth two (2) times a day. tiotropium (Spiriva with HandiHaler) 18 mcg inhalation capsule Take 1 Capsule by inhalation daily. albuterol (PROVENTIL HFA, VENTOLIN HFA, PROAIR HFA) 90 mcg/actuation inhaler Take  by inhalation. Indications: chronic obstructive pulmonary disease         Past History     Past Medical History:  Past Medical History:   Diagnosis Date    Alcohol use 9/8/2022    CAD (coronary artery disease) 9/8/2022    Chronic obstructive pulmonary disease (HCC)     COPD (chronic obstructive pulmonary disease) ([facility] [address] 9/8/2022    Heart failure ([facility] [address]     Hypertension     Memory impairment 9/8/2022    Smoker 9/8/2022       Past Surgical History:  Past Surgical History:   Procedure Laterality Date    HX GYN         Family History:  Family History   Problem Relation Age of Onset    Hypertension Mother     Elevated Lipids Mother     Heart Disease Mother     Diabetes Mother     Hypertension Father     Heart Disease Father     Elevated Lipids Father     Kidney Disease Father     Seizures Daughter        Social History:  Social History     Tobacco Use    Smoking status: Every Day     Packs/day: 0.50     Types: Cigarettes    Smokeless tobacco: Never   Substance Use Topics    Alcohol use: Yes     Alcohol/week: 7.0 standard drinks     Types: 7 Cans of beer per week       Allergies:  No Known Allergies      Review of Systems     Review of Systems   Constitutional:  Positive for chills. Negative for fever. HENT:  Positive for rhinorrhea and sore throat. Eyes:  Negative for visual disturbance. Respiratory:  Positive for cough and wheezing. Cardiovascular:  Negative for chest pain. Gastrointestinal:  Positive for nausea and vomiting. Negative for abdominal pain and diarrhea.    Genitourinary:  Negative for dysuria. Neurological:  Positive for headaches. Negative for dizziness, weakness and numbness. All other systems reviewed and are negative. Physical Exam     Physical Exam  Vitals reviewed. Constitutional:       Appearance: Normal appearance. HENT:      Head: Normocephalic and atraumatic. Right Ear: External ear normal.      Left Ear: External ear normal.      Nose: Nose normal.      Mouth/Throat:      Mouth: Mucous membranes are dry. Eyes:      Extraocular Movements: Extraocular movements intact. Conjunctiva/sclera: Conjunctivae normal.      Pupils: Pupils are equal, round, and reactive to light. Cardiovascular:      Rate and Rhythm: Normal rate and regular rhythm. Pulses: Normal pulses. Heart sounds: Normal heart sounds. Pulmonary:      Effort: Pulmonary effort is normal.      Breath sounds: Normal breath sounds. Abdominal:      General: Abdomen is flat. Palpations: Abdomen is soft. Tenderness: There is no abdominal tenderness. There is no guarding. Musculoskeletal:         General: No deformity or signs of injury. Normal range of motion. Cervical back: Neck supple. Skin:     General: Skin is warm and dry. Capillary Refill: Capillary refill takes less than 2 seconds. Neurological:      General: No focal deficit present. Mental Status: She is alert and oriented to person, place, and time. Mental status is at baseline.    Psychiatric:         Mood and Affect: Mood normal.       Lab and Diagnostic Study Results     Diagnostic Study Results     Labs -     Recent Results (from the past 12 hour(s))   CBC WITH AUTOMATED DIFF    Collection Time: 11/19/22 10:30 AM   Result Value Ref Range    WBC 11.0 4.6 - 13.2 K/uL    RBC 3.32 (L) 4.20 - 5.30 M/uL    HGB 10.9 (L) 12.0 - 16.0 g/dL    HCT 31.0 (L) 35.0 - 45.0 %    MCV 93.4 78.0 - 100.0 FL    MCH 32.8 24.0 - 34.0 PG    MCHC 35.2 31.0 - 37.0 g/dL    RDW 12.5 11.6 - 14.5 %    PLATELET 869 141 - 618 K/uL MPV 12.2 (H) 9.2 - 11.8 FL    NRBC 0.0 0  WBC    ABSOLUTE NRBC 0.00 0.00 - 0.01 K/uL    NEUTROPHILS 80 (H) 40 - 73 %    LYMPHOCYTES 8 (L) 21 - 52 %    MONOCYTES 11 (H) 3 - 10 %    EOSINOPHILS 1 0 - 5 %    BASOPHILS 0 0 - 2 %    IMMATURE GRANULOCYTES 0 %    ABS. NEUTROPHILS 8.8 (H) 1.8 - 8.0 K/UL    ABS. LYMPHOCYTES 0.9 0.9 - 3.6 K/UL    ABS. MONOCYTES 1.2 0.05 - 1.2 K/UL    ABS. EOSINOPHILS 0.1 0.0 - 0.4 K/UL    ABS. BASOPHILS 0.0 0.0 - 0.1 K/UL    ABS. IMM. GRANS. 0.0 K/UL    DF MANUAL      PLATELET COMMENTS ADEQUATE PLATELETS      RBC COMMENTS HYPOCHROMIA  1+        RBC COMMENTS SPHEROCYTES  1+        RBC COMMENTS SCHISTOCYTES  FEW       METABOLIC PANEL, COMPREHENSIVE    Collection Time: 11/19/22 10:30 AM   Result Value Ref Range    Sodium 130 (L) 136 - 145 mmol/L    Potassium 3.2 (L) 3.5 - 5.5 mmol/L    Chloride 97 (L) 100 - 111 mmol/L    CO2 28 21 - 32 mmol/L    Anion gap 5 3.0 - 18 mmol/L    Glucose 87 74 - 99 mg/dL    BUN 10 7.0 - 18 MG/DL    Creatinine 0.60 0.6 - 1.3 MG/DL    BUN/Creatinine ratio 17 12 - 20      eGFR >60 >60 ml/min/1.73m2    Calcium 8.6 8.5 - 10.1 MG/DL    Bilirubin, total 4.7 (H) 0.2 - 1.0 MG/DL    ALT (SGPT) 21 13 - 56 U/L    AST (SGOT) 46 (H) 10 - 38 U/L    Alk.  phosphatase 138 (H) 45 - 117 U/L    Protein, total 8.8 (H) 6.4 - 8.2 g/dL    Albumin 2.2 (L) 3.4 - 5.0 g/dL    Globulin 6.6 (H) 2.0 - 4.0 g/dL    A-G Ratio 0.3 (L) 0.8 - 1.7     TROPONIN-HIGH SENSITIVITY    Collection Time: 11/19/22 10:30 AM   Result Value Ref Range    Troponin-High Sensitivity 86 (H) 0 - 54 ng/L   LIPASE    Collection Time: 11/19/22 10:30 AM   Result Value Ref Range    Lipase 78 73 - 393 U/L   COVID-19 WITH INFLUENZA A/B    Collection Time: 11/19/22 10:30 AM   Result Value Ref Range    SARS-CoV-2 by PCR Not detected NOTD      Influenza A by PCR Detected (A) NOTD      Influenza B by PCR Not detected NOTD     EKG, 12 LEAD, INITIAL    Collection Time: 11/19/22 11:18 AM   Result Value Ref Range    Ventricular Rate 79 BPM    Atrial Rate 79 BPM    P-R Interval 114 ms    QRS Duration 70 ms    Q-T Interval 412 ms    QTC Calculation (Bezet) 472 ms    Calculated P Axis 83 degrees    Calculated R Axis 65 degrees    Calculated T Axis 63 degrees    Diagnosis       Normal sinus rhythm  Minimal voltage criteria for LVH, may be normal variant ( Sokolow-Mohan )  Borderline ECG  When compared with ECG of 08-SEP-2022 19:15,  Nonspecific T wave abnormality now evident in Anterior leads     TROPONIN-HIGH SENSITIVITY    Collection Time: 11/19/22  1:50 PM   Result Value Ref Range    Troponin-High Sensitivity 91 (H) 0 - 54 ng/L       Radiologic Studies -  XR CHEST PA LAT   Final Result      Patchy airspace densities on the left which may represent pneumonia. CT Results  (Last 48 hours)      None          CXR Results  (Last 48 hours)                 11/19/22 1055  XR CHEST PA LAT Final result    Impression:      Patchy airspace densities on the left which may represent pneumonia. Narrative:  CLINICAL:  Shortness of breath. COMPARISON: September 8, 2022. TECHNIQUE: Frontal and lateral views of the chest:       Patchy airspace densities on the left. Pleural spaces are clear. No pneumothorax   The mediastinum is unremarkable in appearance.                        Medications given in the ED-  Medications   acetaminophen (TYLENOL) tablet 1,000 mg (1,000 mg Oral Given 11/19/22 1133)   albuterol-ipratropium (DUO-NEB) 2.5 MG-0.5 MG/3 ML (3 mL Nebulization Given 11/19/22 1133)   cefTRIAXone (ROCEPHIN) injection 1 g (1 g IntraVENous Given 11/19/22 1240)   azithromycin (ZITHROMAX) 500 mg in 0.9% sodium chloride 250 mL (VIAL-MATE) (0 mg IntraVENous IV Completed 11/19/22 1522)   ondansetron (ZOFRAN) injection 4 mg (4 mg IntraVENous Given 11/19/22 1251)   metoclopramide HCl (REGLAN) injection 10 mg (10 mg IntraVENous Given 11/19/22 1334)   potassium bicarb-citric acid (EFFER-K) tablet 40 mEq (40 mEq Oral Given 11/19/22 1537) Medical Decision Making and ED Course   - I am the first and primary provider for this patient AND AM THE PRIMARY PROVIDER OF RECORD. - I reviewed the vital signs, available nursing notes, past medical history, past surgical history, family history and social history. - Initial assessment performed. The patients presenting problems have been discussed, and the staff are in agreement with the care plan formulated and outlined with them. I have encouraged them to ask questions as they arise throughout their visit. Vital Signs-Reviewed the patient's vital signs. Patient Vitals for the past 12 hrs:   Temp Pulse Resp BP SpO2   11/19/22 1538 -- 90 -- (!) 105/50 --   11/19/22 1302 -- -- -- 120/63 97 %   11/19/22 1131 -- -- -- (!) 108/53 96 %   11/19/22 1121 -- -- -- (!) 107/58 --   11/19/22 0930 97.8 °F (36.6 °C) 94 20 (!) 101/59 94 %       EKG interpretation: EKG with normal sinus rhythm at 79 bpm, , no ST elevations or depressions, no STEMI. Records Reviewed: Nursing Notes, Old Medical Records, Previous electrocardiograms, Previous Radiology Studies, and Previous Laboratory Studies        Provider Notes (Medical Decision Making):     MDM  Number of Diagnoses or Management Options  Diagnosis management comments: Patient was in no acute respiratory distress upon my initial evaluation, speaking in full sentences, lung exam was benign. Patient reports symptomatic improvement with a DuoNeb given in the ED, with relief of her chest tightness and wheezing. Oxygen saturation was well throughout her hospital stay. Patient tested positive for influenza with chest x-rays concerning for infiltrates. Covered with antibiotics for community-acquired pneumonia. Lab and imaging results reviewed, patient with slight elevation in troponin which is at baseline, repeat Trop with no significant increase. Patient with hypokalemia which was repleted in the ED.   Discussed lab and imaging results with patient, patient does not meet any inpatient criteria for admission. Discussed with observation as well as return to ED precautions. ED Course:   12:21 PM Initial assessment performed. The patients presenting problems have been discussed, and they are in agreement with the care plan formulated and outlined with them. I have encouraged them to ask questions as they arise throughout their visit. Disposition     Discharged      Diagnosis     CLINICAL IMPRESSION:    1. Influenza A    2. Community acquired pneumonia of left lung, unspecified part of lung    3. Hypokalemia        PLAN:  1. D/C Home  2. Current Discharge Medication List        START taking these medications    Details   amoxicillin-clavulanate (Augmentin) 875-125 mg per tablet Take 1 Tablet by mouth two (2) times a day for 7 days. Qty: 14 Tablet, Refills: 0  Start date: 11/19/2022, End date: 11/26/2022           3. Follow-up Information       Follow up With Specialties Details Why Contact Info    Lyn Rock PA-C Physician Assistant In 1 week  [facility] [address]  [phone]      [facility] EMERGENCY DEPT Emergency Medicine  If symptoms worsen [address] [place] [phone]            Attestations:    Lennox Nephew, DO    Please note that this dictation was completed with [employer], the computer voice recognition software. Quite often unanticipated grammatical, syntax, homophones, and other interpretive errors are inadvertently transcribed by the computer software. Please disregard these errors. Please excuse any errors that have escaped final proofreading. Thank you.

## 2022-11-19 NOTE — ED TRIAGE NOTES
Per patient, she has a Hx of COPD. She states \"I don't feel good\", she says she has been sick for two weeks. She states she has abdominal pain and no appetite.

## 2022-11-20 LAB
ATRIAL RATE: 79 BPM
CALCULATED P AXIS, ECG09: 83 DEGREES
CALCULATED R AXIS, ECG10: 65 DEGREES
CALCULATED T AXIS, ECG11: 63 DEGREES
DIAGNOSIS, 93000: NORMAL
P-R INTERVAL, ECG05: 114 MS
Q-T INTERVAL, ECG07: 412 MS
QRS DURATION, ECG06: 70 MS
QTC CALCULATION (BEZET), ECG08: 472 MS
VENTRICULAR RATE, ECG03: 79 BPM

## 2022-11-20 RX ORDER — AMOXICILLIN AND CLAVULANATE POTASSIUM 875; 125 MG/1; MG/1
1 TABLET, FILM COATED ORAL 2 TIMES DAILY
Qty: 14 TABLET | Refills: 0 | Status: SHIPPED | OUTPATIENT
Start: 2022-11-20 | End: 2022-11-27